# Patient Record
Sex: MALE | Race: WHITE | NOT HISPANIC OR LATINO | URBAN - METROPOLITAN AREA
[De-identification: names, ages, dates, MRNs, and addresses within clinical notes are randomized per-mention and may not be internally consistent; named-entity substitution may affect disease eponyms.]

---

## 2024-03-27 ENCOUNTER — INPATIENT (INPATIENT)
Facility: HOSPITAL | Age: 49
LOS: 7 days | Discharge: HOME | DRG: 369 | End: 2024-04-04
Attending: INTERNAL MEDICINE | Admitting: HOSPITALIST
Payer: SELF-PAY

## 2024-03-27 VITALS
HEART RATE: 80 BPM | WEIGHT: 175.05 LBS | DIASTOLIC BLOOD PRESSURE: 84 MMHG | RESPIRATION RATE: 20 BRPM | OXYGEN SATURATION: 99 % | SYSTOLIC BLOOD PRESSURE: 137 MMHG | TEMPERATURE: 98 F | HEIGHT: 70 IN

## 2024-03-27 DIAGNOSIS — I20.0 UNSTABLE ANGINA: ICD-10-CM

## 2024-03-27 DIAGNOSIS — Z90.81 ACQUIRED ABSENCE OF SPLEEN: Chronic | ICD-10-CM

## 2024-03-27 LAB
ADD ON TEST-SPECIMEN IN LAB: SIGNIFICANT CHANGE UP
ALBUMIN SERPL ELPH-MCNC: 4.2 G/DL — SIGNIFICANT CHANGE UP (ref 3.3–5)
ALP SERPL-CCNC: 95 U/L — SIGNIFICANT CHANGE UP (ref 40–120)
ALT FLD-CCNC: 18 U/L — SIGNIFICANT CHANGE UP (ref 10–45)
AMPHET UR-MCNC: NEGATIVE — SIGNIFICANT CHANGE UP
ANION GAP SERPL CALC-SCNC: 11 MMOL/L — SIGNIFICANT CHANGE UP (ref 5–17)
APAP SERPL-MCNC: <15 UG/ML — SIGNIFICANT CHANGE UP (ref 10–30)
APPEARANCE UR: CLEAR — SIGNIFICANT CHANGE UP
APTT BLD: 28.1 SEC — SIGNIFICANT CHANGE UP (ref 24.5–35.6)
AST SERPL-CCNC: 26 U/L — SIGNIFICANT CHANGE UP (ref 10–40)
BACTERIA # UR AUTO: NEGATIVE /HPF — SIGNIFICANT CHANGE UP
BARBITURATES UR SCN-MCNC: NEGATIVE — SIGNIFICANT CHANGE UP
BASE EXCESS BLDV CALC-SCNC: 3.6 MMOL/L — HIGH (ref -2–3)
BASOPHILS # BLD AUTO: 0.07 K/UL — SIGNIFICANT CHANGE UP (ref 0–0.2)
BASOPHILS NFR BLD AUTO: 1.5 % — SIGNIFICANT CHANGE UP (ref 0–2)
BENZODIAZ UR-MCNC: NEGATIVE — SIGNIFICANT CHANGE UP
BILIRUB SERPL-MCNC: 0.1 MG/DL — LOW (ref 0.2–1.2)
BILIRUB UR-MCNC: NEGATIVE — SIGNIFICANT CHANGE UP
BLD GP AB SCN SERPL QL: NEGATIVE — SIGNIFICANT CHANGE UP
BUN SERPL-MCNC: 20 MG/DL — SIGNIFICANT CHANGE UP (ref 7–23)
CA-I SERPL-SCNC: 1.24 MMOL/L — SIGNIFICANT CHANGE UP (ref 1.15–1.33)
CALCIUM SERPL-MCNC: 9.3 MG/DL — SIGNIFICANT CHANGE UP (ref 8.4–10.5)
CAST: 0 /LPF — SIGNIFICANT CHANGE UP (ref 0–4)
CHLORIDE BLDV-SCNC: 102 MMOL/L — SIGNIFICANT CHANGE UP (ref 96–108)
CHLORIDE SERPL-SCNC: 101 MMOL/L — SIGNIFICANT CHANGE UP (ref 96–108)
CK MB CFR SERPL CALC: 4.3 NG/ML — SIGNIFICANT CHANGE UP (ref 0–6.7)
CK SERPL-CCNC: 157 U/L — SIGNIFICANT CHANGE UP (ref 30–200)
CO2 BLDV-SCNC: 31 MMOL/L — HIGH (ref 22–26)
CO2 SERPL-SCNC: 26 MMOL/L — SIGNIFICANT CHANGE UP (ref 22–31)
COCAINE METAB.OTHER UR-MCNC: NEGATIVE — SIGNIFICANT CHANGE UP
COLOR SPEC: YELLOW — SIGNIFICANT CHANGE UP
CREAT SERPL-MCNC: 0.87 MG/DL — SIGNIFICANT CHANGE UP (ref 0.5–1.3)
DIFF PNL FLD: NEGATIVE — SIGNIFICANT CHANGE UP
EGFR: 106 ML/MIN/1.73M2 — SIGNIFICANT CHANGE UP
EOSINOPHIL # BLD AUTO: 0.56 K/UL — HIGH (ref 0–0.5)
EOSINOPHIL NFR BLD AUTO: 11.8 % — HIGH (ref 0–6)
ETHANOL SERPL-MCNC: <10 MG/DL — SIGNIFICANT CHANGE UP (ref 0–10)
GAS PNL BLDV: 134 MMOL/L — LOW (ref 136–145)
GAS PNL BLDV: SIGNIFICANT CHANGE UP
GLUCOSE BLDV-MCNC: 111 MG/DL — HIGH (ref 70–99)
GLUCOSE SERPL-MCNC: 118 MG/DL — HIGH (ref 70–99)
GLUCOSE UR QL: NEGATIVE MG/DL — SIGNIFICANT CHANGE UP
HCO3 BLDV-SCNC: 30 MMOL/L — HIGH (ref 22–29)
HCT VFR BLD CALC: 34.9 % — LOW (ref 39–50)
HCT VFR BLDA CALC: 36 % — LOW (ref 39–51)
HGB BLD CALC-MCNC: 11.9 G/DL — LOW (ref 12.6–17.4)
HGB BLD-MCNC: 11.5 G/DL — LOW (ref 13–17)
INR BLD: 1.03 RATIO — SIGNIFICANT CHANGE UP (ref 0.85–1.18)
KETONES UR-MCNC: NEGATIVE MG/DL — SIGNIFICANT CHANGE UP
LACTATE BLDV-MCNC: 1.4 MMOL/L — SIGNIFICANT CHANGE UP (ref 0.5–2)
LEUKOCYTE ESTERASE UR-ACNC: NEGATIVE — SIGNIFICANT CHANGE UP
LYMPHOCYTES # BLD AUTO: 1.84 K/UL — SIGNIFICANT CHANGE UP (ref 1–3.3)
LYMPHOCYTES # BLD AUTO: 38.8 % — SIGNIFICANT CHANGE UP (ref 13–44)
MAGNESIUM SERPL-MCNC: 1.9 MG/DL — SIGNIFICANT CHANGE UP (ref 1.6–2.6)
MCHC RBC-ENTMCNC: 27.8 PG — SIGNIFICANT CHANGE UP (ref 27–34)
MCHC RBC-ENTMCNC: 33 GM/DL — SIGNIFICANT CHANGE UP (ref 32–36)
MCV RBC AUTO: 84.3 FL — SIGNIFICANT CHANGE UP (ref 80–100)
METHADONE UR-MCNC: NEGATIVE — SIGNIFICANT CHANGE UP
MONOCYTES # BLD AUTO: 0.61 K/UL — SIGNIFICANT CHANGE UP (ref 0–0.9)
MONOCYTES NFR BLD AUTO: 12.9 % — SIGNIFICANT CHANGE UP (ref 2–14)
NEUTROPHILS # BLD AUTO: 1.66 K/UL — LOW (ref 1.8–7.4)
NEUTROPHILS NFR BLD AUTO: 35 % — LOW (ref 43–77)
NITRITE UR-MCNC: NEGATIVE — SIGNIFICANT CHANGE UP
NRBC # BLD: 0 /100 WBCS — SIGNIFICANT CHANGE UP (ref 0–0)
NT-PROBNP SERPL-SCNC: 124 PG/ML — SIGNIFICANT CHANGE UP (ref 0–300)
OPIATES UR-MCNC: NEGATIVE — SIGNIFICANT CHANGE UP
OXYCODONE UR-MCNC: NEGATIVE — SIGNIFICANT CHANGE UP
PCO2 BLDV: 50 MMHG — SIGNIFICANT CHANGE UP (ref 42–55)
PCP SPEC-MCNC: SIGNIFICANT CHANGE UP
PCP UR-MCNC: NEGATIVE — SIGNIFICANT CHANGE UP
PH BLDV: 7.38 — SIGNIFICANT CHANGE UP (ref 7.32–7.43)
PH UR: 6.5 — SIGNIFICANT CHANGE UP (ref 5–8)
PHOSPHATE SERPL-MCNC: 2.8 MG/DL — SIGNIFICANT CHANGE UP (ref 2.5–4.5)
PLATELET # BLD AUTO: 368 K/UL — SIGNIFICANT CHANGE UP (ref 150–400)
PO2 BLDV: 40 MMHG — SIGNIFICANT CHANGE UP (ref 25–45)
POTASSIUM BLDV-SCNC: 4 MMOL/L — SIGNIFICANT CHANGE UP (ref 3.5–5.1)
POTASSIUM SERPL-MCNC: 4.2 MMOL/L — SIGNIFICANT CHANGE UP (ref 3.5–5.3)
POTASSIUM SERPL-SCNC: 4.2 MMOL/L — SIGNIFICANT CHANGE UP (ref 3.5–5.3)
PROT SERPL-MCNC: 7.4 G/DL — SIGNIFICANT CHANGE UP (ref 6–8.3)
PROT UR-MCNC: NEGATIVE MG/DL — SIGNIFICANT CHANGE UP
PROTHROM AB SERPL-ACNC: 11.3 SEC — SIGNIFICANT CHANGE UP (ref 9.5–13)
RBC # BLD: 4.14 M/UL — LOW (ref 4.2–5.8)
RBC # FLD: 14 % — SIGNIFICANT CHANGE UP (ref 10.3–14.5)
RBC CASTS # UR COMP ASSIST: 0 /HPF — SIGNIFICANT CHANGE UP (ref 0–4)
RH IG SCN BLD-IMP: POSITIVE — SIGNIFICANT CHANGE UP
SALICYLATES SERPL-MCNC: <2 MG/DL — LOW (ref 15–30)
SAO2 % BLDV: 72.2 % — SIGNIFICANT CHANGE UP (ref 67–88)
SODIUM SERPL-SCNC: 138 MMOL/L — SIGNIFICANT CHANGE UP (ref 135–145)
SP GR SPEC: >1.03 — HIGH (ref 1–1.03)
SQUAMOUS # UR AUTO: 0 /HPF — SIGNIFICANT CHANGE UP (ref 0–5)
THC UR QL: NEGATIVE — SIGNIFICANT CHANGE UP
TROPONIN T, HIGH SENSITIVITY RESULT: 9 NG/L — SIGNIFICANT CHANGE UP (ref 0–51)
UROBILINOGEN FLD QL: 0.2 MG/DL — SIGNIFICANT CHANGE UP (ref 0.2–1)
WBC # BLD: 4.74 K/UL — SIGNIFICANT CHANGE UP (ref 3.8–10.5)
WBC # FLD AUTO: 4.74 K/UL — SIGNIFICANT CHANGE UP (ref 3.8–10.5)
WBC UR QL: 0 /HPF — SIGNIFICANT CHANGE UP (ref 0–5)

## 2024-03-27 PROCEDURE — 99223 1ST HOSP IP/OBS HIGH 75: CPT

## 2024-03-27 PROCEDURE — 99285 EMERGENCY DEPT VISIT HI MDM: CPT

## 2024-03-27 PROCEDURE — 71275 CT ANGIOGRAPHY CHEST: CPT | Mod: 26,MC

## 2024-03-27 PROCEDURE — 71045 X-RAY EXAM CHEST 1 VIEW: CPT | Mod: 26

## 2024-03-27 PROCEDURE — 74174 CTA ABD&PLVS W/CONTRAST: CPT | Mod: 26,MC

## 2024-03-27 RX ORDER — ASPIRIN/CALCIUM CARB/MAGNESIUM 324 MG
324 TABLET ORAL ONCE
Refills: 0 | Status: COMPLETED | OUTPATIENT
Start: 2024-03-27 | End: 2024-03-27

## 2024-03-27 RX ORDER — PANTOPRAZOLE SODIUM 20 MG/1
8 TABLET, DELAYED RELEASE ORAL
Qty: 80 | Refills: 0 | Status: DISCONTINUED | OUTPATIENT
Start: 2024-03-27 | End: 2024-03-29

## 2024-03-27 RX ORDER — FAMOTIDINE 10 MG/ML
20 INJECTION INTRAVENOUS ONCE
Refills: 0 | Status: COMPLETED | OUTPATIENT
Start: 2024-03-27 | End: 2024-03-27

## 2024-03-27 RX ORDER — PANTOPRAZOLE SODIUM 20 MG/1
80 TABLET, DELAYED RELEASE ORAL ONCE
Refills: 0 | Status: COMPLETED | OUTPATIENT
Start: 2024-03-27 | End: 2024-03-27

## 2024-03-27 RX ORDER — ONDANSETRON 8 MG/1
4 TABLET, FILM COATED ORAL ONCE
Refills: 0 | Status: COMPLETED | OUTPATIENT
Start: 2024-03-27 | End: 2024-03-27

## 2024-03-27 RX ORDER — SODIUM CHLORIDE 9 MG/ML
1000 INJECTION INTRAMUSCULAR; INTRAVENOUS; SUBCUTANEOUS ONCE
Refills: 0 | Status: COMPLETED | OUTPATIENT
Start: 2024-03-27 | End: 2024-03-27

## 2024-03-27 RX ORDER — ONDANSETRON 8 MG/1
4 TABLET, FILM COATED ORAL EVERY 8 HOURS
Refills: 0 | Status: DISCONTINUED | OUTPATIENT
Start: 2024-03-27 | End: 2024-04-04

## 2024-03-27 RX ORDER — ACETAMINOPHEN 500 MG
650 TABLET ORAL EVERY 6 HOURS
Refills: 0 | Status: DISCONTINUED | OUTPATIENT
Start: 2024-03-27 | End: 2024-04-04

## 2024-03-27 RX ORDER — NITROGLYCERIN 6.5 MG
0.4 CAPSULE, EXTENDED RELEASE ORAL ONCE
Refills: 0 | Status: COMPLETED | OUTPATIENT
Start: 2024-03-27 | End: 2024-03-27

## 2024-03-27 RX ORDER — ACETAMINOPHEN 500 MG
1000 TABLET ORAL ONCE
Refills: 0 | Status: COMPLETED | OUTPATIENT
Start: 2024-03-27 | End: 2024-03-27

## 2024-03-27 RX ADMIN — Medication 0.4 MILLIGRAM(S): at 19:25

## 2024-03-27 RX ADMIN — SODIUM CHLORIDE 1000 MILLILITER(S): 9 INJECTION INTRAMUSCULAR; INTRAVENOUS; SUBCUTANEOUS at 19:14

## 2024-03-27 RX ADMIN — ONDANSETRON 4 MILLIGRAM(S): 8 TABLET, FILM COATED ORAL at 18:33

## 2024-03-27 RX ADMIN — PANTOPRAZOLE SODIUM 10 MG/HR: 20 TABLET, DELAYED RELEASE ORAL at 22:10

## 2024-03-27 RX ADMIN — Medication 400 MILLIGRAM(S): at 18:33

## 2024-03-27 RX ADMIN — PANTOPRAZOLE SODIUM 80 MILLIGRAM(S): 20 TABLET, DELAYED RELEASE ORAL at 18:33

## 2024-03-27 RX ADMIN — Medication 324 MILLIGRAM(S): at 19:14

## 2024-03-27 RX ADMIN — FAMOTIDINE 20 MILLIGRAM(S): 10 INJECTION INTRAVENOUS at 19:46

## 2024-03-27 NOTE — CONSULT NOTE ADULT - ATTENDING COMMENTS
As per fellow note, atypical chest / neck discomfort in setting of a number of CRF's. EKG with LBBB (no baseline for comparison); trops negative. This am, chest / neck pain better, but remains (mild); worse with inspiration. Suggest echo and CCTA. If normal, can proceed with further GI evaluation.

## 2024-03-27 NOTE — H&P ADULT - NSHPREVIEWOFSYSTEMS_GEN_ALL_CORE
Review of Systems:   CONSTITUTIONAL: No fever, weight loss  EYES: No eye pain, visual disturbances, or discharge  ENMT:  No difficulty hearing, tinnitus, vertigo; No sinus or throat pain  RESPIRATORY: No SOB. No cough, wheezing, chills or hemoptysis  CARDIOVASCULAR: No chest pain, palpitations, dizziness, or leg swelling  GASTROINTESTINAL: No abdominal or epigastric pain. No nausea, vomiting, or hematemesis; No diarrhea or constipation. No melena or hematochezia.  GENITOURINARY: No dysuria, frequency, hematuria, or incontinence  NEUROLOGICAL: No headaches, memory loss, loss of strength, numbness, or tremors  SKIN: No itching, burning, rashes, or lesions   LYMPH NODES: No enlarged glands  ENDOCRINE: No heat or cold intolerance; No hair loss  MUSCULOSKELETAL: No joint pain or swelling; No muscle, back pain  PSYCHIATRIC: No depression, anxiety, mood swings, or difficulty sleeping  HEME/LYMPH: No easy bruising, or bleeding gums Review of Systems:   CONSTITUTIONAL: No fever, weight loss  EYES: No eye pain, visual disturbances, or discharge  ENMT:  No difficulty hearing, tinnitus, vertigo; No sinus or throat pain  RESPIRATORY: No SOB. No cough, wheezing, chills or hemoptysis  CARDIOVASCULAR: No chest pain, palpitations, dizziness, or leg swelling  GASTROINTESTINAL: +nausea and vomiting. No hematemesis; No diarrhea or constipation. No melena or hematochezia.  GENITOURINARY: No dysuria, frequency, hematuria, or incontinence  NEUROLOGICAL: No headaches, memory loss, loss of strength, numbness, or tremors  SKIN: No itching, burning, rashes, or lesions   LYMPH NODES: No enlarged glands  ENDOCRINE: No heat or cold intolerance; No hair loss  MUSCULOSKELETAL: No joint pain or swelling; No muscle, back pain  PSYCHIATRIC: No depression, anxiety, mood swings, or difficulty sleeping  HEME/LYMPH: No easy bruising, or bleeding gums Review of Systems:   CONSTITUTIONAL: No fever, weight loss  EYES: No eye pain, visual disturbances, or discharge  RESPIRATORY: No SOB. No cough, wheezing, chills or hemoptysis  CARDIOVASCULAR: +upper chest and neck pain, no palpitations, dizziness, or leg swelling  GASTROINTESTINAL: +nausea and vomiting. No hematemesis; No diarrhea or constipation. No melena or hematochezia.  GENITOURINARY: No dysuria, frequency, hematuria, or incontinence  NEUROLOGICAL: No headaches, memory loss, loss of strength, numbness, or tremors  SKIN: No itching, burning, rashes, or lesions   MUSCULOSKELETAL: No joint pain or swelling; No muscle, back pain  PSYCHIATRIC: No depression, anxiety, mood swings, or difficulty sleeping

## 2024-03-27 NOTE — H&P ADULT - NSHPLABSRESULTS_GEN_ALL_CORE
11.5   4.74  )-----------( 368      ( 27 Mar 2024 18:36 )             34.9     03-27    138  |  101  |  20  ----------------------------<  118<H>  4.2   |  26  |  0.87    Ca    9.3      27 Mar 2024 18:36  Phos  2.8     03-27  Mg     1.9     03-27    TPro  7.4  /  Alb  4.2  /  TBili  0.1<L>  /  DBili  x   /  AST  26  /  ALT  18  /  AlkPhos  95  03-27    CARDIAC MARKERS ( 27 Mar 2024 19:52 )  x     / x     / 157 U/L / x     / 4.3 ng/mL  CARDIAC MARKERS ( 27 Mar 2024 18:36 )  x     / x     / 178 U/L / x     / 4.7 ng/mL        LIVER FUNCTIONS - ( 27 Mar 2024 18:36 )  Alb: 4.2 g/dL / Pro: 7.4 g/dL / ALK PHOS: 95 U/L / ALT: 18 U/L / AST: 26 U/L / GGT: x           PT/INR - ( 27 Mar 2024 18:36 )   PT: 11.3 sec;   INR: 1.03 ratio         PTT - ( 27 Mar 2024 18:36 )  PTT:28.1 sec  Urinalysis Basic - ( 27 Mar 2024 21:33 )    Color: Yellow / Appearance: Clear / SG: >1.030 / pH: x  Gluc: x / Ketone: Negative mg/dL  / Bili: Negative / Urobili: 0.2 mg/dL   Blood: x / Protein: Negative mg/dL / Nitrite: Negative   Leuk Esterase: Negative / RBC: 0 /HPF / WBC 0 /HPF   Sq Epi: x / Non Sq Epi: 0 /HPF / Bacteria: Negative /HPF

## 2024-03-27 NOTE — H&P ADULT - HISTORY OF PRESENT ILLNESS
This is a 49 y/o male w/ PMHx sarcoidosis, optic neuritis and active smoking who presents for chest pain and black emesis. His symptoms first started with an episode of emesis yesterdat which was yellow-colored. Today, he started vomiting several times, and at the 5th episode, the color of the vomitus turned from yellow to a dark, black-arleth color. Around that time, he also started to develop chest pain as well. The chest pain was in the middle of his chest, radiated to the left side of his neck, squeezing in quality, and was constant. Nothing relieved or worsened the pain, and he didn't have any associated shortness of breath, dyspnea on exertion, diaphoresis, or palpitations. He doesn't take any medications, hasn't seen a doctor in years. Is currently an active smoker, is a 15-pack year smoker and smokes 1/2 pack a day currently. Decided to come to the ED for further evaluation.    In the ED, he was afebrile and hemodynamically stable, saturating well on RA. CBC w/ normocytic anemia of 11.5, CMP grossly wnl. Troponin 9-> 10, CKMB 4.7->4.3, CPK 2.6%, pro-. UA w/ specific gravity >1.03. Urine drug screen negative. CTA chest/abdomen/pelvis was done, which was negative for dissection, positive for coronary calcifications, and wall thickening of the GE junction and gastric cardia. ECG showing NSR w/ LBBB, no prior comparisons available. Given ASA 324mg, SL NTG, IVP pantoprazole 80mg, IVP Pepcid 20mg, and started on pantoprazole drip in the ED. This is a 47 y/o male w/ PMHx sarcoidosis, optic neuritis and active smoking who presents for chest pain and black emesis. His symptoms first started with an episode of emesis yesterdat which was yellow-colored. Today, he started vomiting several times, and at the 5th episode, the color of the vomitus turned from yellow to a dark, black-arleth color. Around that time, he also started to develop chest pain as well. The chest pain was in the middle of his chest, radiated to the left side of his neck, squeezing in quality, and was constant. Nothing relieved or worsened the pain, and he didn't have any associated shortness of breath, dyspnea on exertion, diaphoresis, or palpitations. He doesn't take any medications. Is currently an active smoker, is a 15-pack year smoker and smokes 1/2 pack a day currently. Decided to come to the ED for further evaluation.  Of note, he states he had a lower GI bleed over 2 decades ago and required a colonoscopy, hasn't had one since then. His sarcoidosis was diagnosed 3 years ago in [place] when an MRI of his brain which was being done for optic neuritis captured the top of his lungs. He had a mediastinoscopy w/ biopsy which confirmed the diagnosis. He doesn't take any medications for it, said he has taken prednisone in the past for a flare-up. Currently lives in [facility], says he follows with [facility].    In the ED, he was afebrile and hemodynamically stable, saturating well on RA. CBC w/ normocytic anemia of 11.5, CMP grossly wnl. Troponin 9-> 10, CKMB 4.7->4.3, CPK 2.6%, pro-. UA w/ specific gravity >1.03. Urine drug screen negative. CTA chest/abdomen/pelvis was done, which was negative for dissection, positive for coronary calcifications, and wall thickening of the GE junction and gastric cardia. ECG showing NSR w/ LBBB, no prior comparisons available. Given ASA 324mg, SL NTG, IVP pantoprazole 80mg, IVP Pepcid 20mg, and started on pantoprazole drip in the ED.

## 2024-03-27 NOTE — H&P ADULT - PROBLEM SELECTOR PLAN 2
- Troponin negative x2, check 3rd troponin per cardiology recs  - ECG NSR w/ LBBB  - Unclear if angina vs pain from frequent vomiting, per cardio, hold off on further antiplatelets at this time, patient s/p ASA 324mg in ED  - Coronary calcifications seen on CTA chest, cardiology considering CT coronaries, f/u official decision  - SL NTG not helpful for chest pain  - Heart score of 5 - (2pts for highly suspicious chest pain, 1 pt ECG w/ LBBB, 1 pt age >45, 1 pt for 1-2 risk factors [active smoking, MI in mother])  - Urine drug screen negative  - F/u A1c, TSH, lipid panel  - F/u TTE - Troponin negative x2, check 3rd troponin per cardiology recs  - ECG NSR w/ LBBB  - Unclear if angina vs pain from frequent vomiting, per cardio, hold off on further antiplatelets at this time given patient with coffee-ground emesis, patient s/p ASA 324mg in ED  - Coronary calcifications seen on CTA chest, cardiology considering CT coronaries, f/u official decision  - SL NTG not helpful for chest pain  - Heart score of 5 - (2pts for highly suspicious chest pain, 1 pt ECG w/ LBBB, 1 pt age >45, 1 pt for 1-2 risk factors [active smoking, MI in mother])  - Urine drug screen negative  - F/u A1c, TSH, lipid panel  - F/u TTE

## 2024-03-27 NOTE — H&P ADULT - PROBLEM SELECTOR PLAN 3
- Hb 11.5, no recent baseline to compare  - F/u iron, TIBC, ferritin, transferrin, ferritin, and B12  - - Hb 11.5, no recent baseline to compare  - F/u iron, TIBC, ferritin, transferrin, ferritin, and B12

## 2024-03-27 NOTE — CONSULT NOTE ADULT - SUBJECTIVE AND OBJECTIVE BOX
Chief Complaint: Chest pain     HPI:  47 yo man with poor medical contact who presents to the ED with chest pain.         PMHx:   Sarcoidosis    PSHx:       Allergies:  No Known Allergies      Home Meds:    Current Medications:       REVIEW OF SYSTEMS:  CONSTITUTIONAL: No weakness, fevers or chills  EYES/ENT: No visual changes;  No dysphagia  NECK: No pain or stiffness  RESPIRATORY: No cough, wheezing, hemoptysis; No shortness of breath  CARDIOVASCULAR: No chest pain or palpitations; No lower extremity edema  GASTROINTESTINAL: No abdominal or epigastric pain. No nausea, vomiting, or hematemesis; No diarrhea or constipation. No melena or hematochezia.  BACK: No back pain  GENITOURINARY: No dysuria, frequency or hematuria  NEUROLOGICAL: No numbness or weakness  SKIN: No itching, burning, rashes, or lesions   All other review of systems is negative unless indicated above.    Physical Exam:  T(F): 98 (03-27), Max: 98 (03-27)  HR: 80 (03-27) (80 - 80)  BP: 121/69 (03-27) (121/69 - 137/84)  RR: 19 (03-27)  SpO2: 98% (03-27)  GENERAL: No acute distress, well-developed  HEAD:  Atraumatic, Normocephalic  ENT: EOMI, PERRLA, conjunctiva and sclera clear, Neck supple, No JVD, moist mucosa  CHEST/LUNG: Clear to auscultation bilaterally; No wheeze, equal breath sounds bilaterally   BACK: No spinal tenderness  HEART: Regular rate and rhythm; No murmurs, rubs, or gallops  ABDOMEN: Soft, Nontender, Nondistended; Bowel sounds present  EXTREMITIES:  No clubbing, cyanosis, or edema  PSYCH: Nl behavior, nl affect  NEUROLOGY: AAOx3, non-focal, cranial nerves intact  SKIN: Normal color, No rashes or lesions  LINES:    Cardiovascular Diagnostic Testing:    ECG: Personally reviewed:    Echo: Personally reviewed:    Stress Testing:    Cath:    Imaging:    CXR: Personally reviewed    Labs: Personally reviewed                        11.5   4.74  )-----------( 368      ( 27 Mar 2024 18:36 )             34.9     03-27    138  |  101  |  20  ----------------------------<  118<H>  4.2   |  26  |  0.87    Ca    9.3      27 Mar 2024 18:36  Phos  2.8     03-27  Mg     1.9     03-27    TPro  7.4  /  Alb  4.2  /  TBili  0.1<L>  /  DBili  x   /  AST  26  /  ALT  18  /  AlkPhos  95  03-27    PT/INR - ( 27 Mar 2024 18:36 )   PT: 11.3 sec;   INR: 1.03 ratio         PTT - ( 27 Mar 2024 18:36 )  PTT:28.1 sec    CARDIAC MARKERS ( 27 Mar 2024 18:36 )  9 ng/L / x     / x     / 178 U/L / x     / 4.7 ng/mL                 Chief Complaint: Chest pain     HPI:  49 yo man with hx of pulmonary sarcoidosis, active tobacco use, optic neuritis, and poor medical contact who presents to the ED with chest pain.   Yesterday has had one episode of yellow colored emesis. Today had persistent nausea with 8+ episodes of emesis. Initially emesis was yellow, but after ~5 he reports it being very dark, black in color. After 5 he also developed chest pain that was L sided. Started 4 hours prior to my encounter with him (probably started around 3pm). Radiated up L side of neck. Squeezing and sore like pain. Constant. No prior episodes. Not worsened by exertion, position, and eating. Denies SOB, orthopnea, palpitations, fevers, chills, diarrhea, cough, and URI sx. Denies alcohol and recreational drug use. 15 pack year smoking history. Smokes 1/2ppd currently. Has never seen a Cardiologist. No prior MI or stents. Mother had MI in her late 70s. Does not take any medications.     In the ED   VSS.   EKG SR with LBBB not meeting Sgarbossa criteria.   Still complaining of chest pain refractory to SL nitro.   Cardiology consulted for unstable angina.         PMHx:   Sarcoidosis    PSHx:   Splenectomy    Allergies:  No Known Allergies    Home Meds:  None    Current Medications:   None     REVIEW OF SYSTEMS:  CONSTITUTIONAL: No weakness, fevers or chills  RESPIRATORY: No cough, wheezing, hemoptysis; No shortness of breath  CARDIOVASCULAR: No palpitations; No lower extremity edema  GASTROINTESTINAL: No abdominal or epigastric pain. No nausea, vomiting, or hematemesis; No diarrhea or constipation. No melena or hematochezia.  BACK: No back pain  GENITOURINARY: No dysuria, frequency or hematuria  NEUROLOGICAL: No numbness or weakness  SKIN: No itching, burning, rashes, or lesions   All other review of systems is negative unless indicated above.    Physical Exam:  T(F): 98 (03-27), Max: 98 (03-27)  HR: 80 (03-27) (80 - 80)  BP: 121/69 (03-27) (121/69 - 137/84)  RR: 19 (03-27)  SpO2: 98% (03-27)    GENERAL: thin, unkempt, poor dentition, appears older than stated age   HEAD:  Atraumatic, Normocephalic  ENT: EOMI, PERRLA, conjunctiva and sclera clear, Neck supple, No JVD, moist mucosa  CHEST/LUNG: Clear to auscultation bilaterally; No wheeze, equal breath sounds bilaterally   BACK: No spinal tenderness  HEART: Regular rate and rhythm; No murmurs, rubs, or gallops  ABDOMEN: Soft, Nontender, Nondistended; Bowel sounds present  EXTREMITIES:  No clubbing, cyanosis, or edema  PSYCH: Nl behavior, nl affect  NEUROLOGY: AAOx3, non-focal, cranial nerves intact  SKIN: Normal color, No rashes or lesions  LINES:      Imaging:    CXR: Personally reviewed    Labs: Personally reviewed                        11.5   4.74  )-----------( 368      ( 27 Mar 2024 18:36 )             34.9     03-27    138  |  101  |  20  ----------------------------<  118<H>  4.2   |  26  |  0.87    Ca    9.3      27 Mar 2024 18:36  Phos  2.8     03-27  Mg     1.9     03-27    TPro  7.4  /  Alb  4.2  /  TBili  0.1<L>  /  DBili  x   /  AST  26  /  ALT  18  /  AlkPhos  95  03-27    PT/INR - ( 27 Mar 2024 18:36 )   PT: 11.3 sec;   INR: 1.03 ratio         PTT - ( 27 Mar 2024 18:36 )  PTT:28.1 sec    CARDIAC MARKERS ( 27 Mar 2024 18:36 )  9 ng/L / x     / x     / 178 U/L / x     / 4.7 ng/mL

## 2024-03-27 NOTE — H&P ADULT - ASSESSMENT
49 y/o male w/ PMHx sarcoidosis, optic neuritis and active smoking who presents for chest pain and black emesis. Found to have anemia of 11.5 and LBBB on ECG. Admitted for workup of possible UGIB and of chest pain.

## 2024-03-27 NOTE — H&P ADULT - NSHPPHYSICALEXAM_GEN_ALL_CORE
Vital Signs Last 24 Hrs  T(C): 36.7 (27 Mar 2024 21:22), Max: 36.7 (27 Mar 2024 18:14)  T(F): 98.1 (27 Mar 2024 21:22), Max: 98.1 (27 Mar 2024 21:22)  HR: 61 (27 Mar 2024 23:06) (61 - 84)  BP: 132/65 (27 Mar 2024 23:06) (121/69 - 137/84)  BP(mean): 85 (27 Mar 2024 23:06) (85 - 89)  RR: 18 (27 Mar 2024 23:06) (18 - 20)  SpO2: 97% (27 Mar 2024 23:06) (97% - 99%)    Parameters below as of 27 Mar 2024 23:06  Patient On (Oxygen Delivery Method): room air        CONSTITUTIONAL: Well-groomed, in no apparent distress  EYES: No conjunctival or scleral injection, non-icteric;   ENMT: No external nasal lesions; MMM  NECK: Trachea midline without palpable neck mass; thyroid not enlarged and non-tender  RESPIRATORY: Breathing comfortably; no dullness to percussion; lungs CTA without wheeze/rhonchi/rales  CARDIOVASCULAR: +S1S2, RRR, no M/G/R; pedal pulses full and symmetric; no lower extremity edema  GASTROINTESTINAL: No palpable masses or tenderness, +BS throughout, no rebound/guarding; no hepatosplenomegaly; no hernia palpated  LYMPHATIC: No cervical LAD or tenderness  SKIN: No rashes or ulcers noted  NEUROLOGIC: CN II-XII intact; sensation intact in LEs b/l to light touch  PSYCHIATRIC: A+O x 3; mood and affect appropriate; appropriate insight and judgment Vital Signs Last 24 Hrs  T(C): 36.7 (27 Mar 2024 21:22), Max: 36.7 (27 Mar 2024 18:14)  T(F): 98.1 (27 Mar 2024 21:22), Max: 98.1 (27 Mar 2024 21:22)  HR: 61 (27 Mar 2024 23:06) (61 - 84)  BP: 132/65 (27 Mar 2024 23:06) (121/69 - 137/84)  BP(mean): 85 (27 Mar 2024 23:06) (85 - 89)  RR: 18 (27 Mar 2024 23:06) (18 - 20)  SpO2: 97% (27 Mar 2024 23:06) (97% - 99%)    Parameters below as of 27 Mar 2024 23:06  Patient On (Oxygen Delivery Method): room air    CONSTITUTIONAL: Well-groomed, in no apparent distress  EYES: No conjunctival or scleral injection, non-icteric;   NECK: Trachea midline without palpable neck mass; thyroid not enlarged and non-tender  RESPIRATORY: Breathing comfortably; no dullness to percussion; lungs CTA without wheeze/rhonchi/rales  CARDIOVASCULAR: +S1S2, RRR, no M/G/R; pedal pulses full and symmetric; no lower extremity edema  GASTROINTESTINAL: No palpable masses or tenderness, +BS throughout, no rebound/guarding  NEUROLOGIC: Sensation intact in LEs b/l to light touch, no focal deficits, 5/5 strength across all extremities  PSYCHIATRIC: AAO x 4

## 2024-03-27 NOTE — ED PROVIDER NOTE - OBJECTIVE STATEMENT
48-year-old male with past medical history of sarcoidosis, not currently on any medications, daily smoker presenting with chief complaint of nausea, vomiting since yesterday.  Today developed black emesis.  Associating with pressure-like chest pain.  Has a history of black emesis in the past,  patient unsure what the underlying diagnosis was at that time.

## 2024-03-27 NOTE — H&P ADULT - PROBLEM SELECTOR PLAN 5
-  CTA chest showing lobulated 1.4 x 0.8 cm density along superior aspect of the right major fissure which appears to be on both sides of the fissure, 5 mm RLL nodule, and linear peripheral density of RLL w/ small nodular component measuring 8 mm  - No prior exams available for comparison, will need outpatient f/u w/ pulmonary for surveillance CTs

## 2024-03-27 NOTE — ED PROVIDER NOTE - PROGRESS NOTE DETAILS
Hemoglobin 11.5.  Do not suspect that patient's chest pain is from anemia.  Will order aspirin   And plan for repeat EKG. Dinorah Hawk, ED Attending Repeat EKG unchanged.  Patient given aspirin and sublingual nitro, still endorsing pain.  Now also endorsing right flank pain.  Will order UA UC, cardiology consulted. Will consider CT angio of the chest abdomen pelvis if no actionable plans with cardiology.  repeat troponin ordered. Dinorah Hawk, ED Attending Amy Whalen (Rodriguez) PGY3 maryann has seen and evaluated pt. agree with admission for cardiac workup at this time. pain still significant. will eval for intra-abdominal and non-coronary etiology of back pain with dissection study. tba. Amy Whalen (Rodriguez) PGY3 accepted to hospitalist service. CT aorta with coronary calcifications and gastritis. GI was emailed. hospitalist requesting protonix gtt.

## 2024-03-27 NOTE — ED PROVIDER NOTE - CLINICAL SUMMARY MEDICAL DECISION MAKING FREE TEXT BOX
48-year-old male with past medical history as detailed above presenting with chief complaint of nausea and vomiting for a day and a half along with pressure-like chest pain that started today.  Endorses shortness of breath.  No pleurisy.  No other significant associated symptoms.  On exam, vital signs stable, lung exam without any focal findings on auscultation.  No reproducible chest pain.  Mild reproducible right upper quadrant pain, but Anthony negative.  Broad differentials at this time.  Given black emesis, concern for possible upper GI bleed  causing anemia versus gallbladder pathology  Versus Boerhaave's or esophageal injury from vomiting versus pneumomediastinum versus ACS, patient has a  left bundle branch block on EKG, no prior EKG available.  Does not meet Sgarbossa's criteria.  will assess H&H and chest x-ray.  If those unremarkable, consider treating as unstable angina. Dinorah Hawk, ED Attending

## 2024-03-27 NOTE — ED ADULT NURSE NOTE - OBJECTIVE STATEMENT
49 y/o Male presents to ED from home with c/o chest pain. Pt states pain began today around 3pm and is located in left chest wall and left neck. Pt was doing marimar when pain began. Endorsing nausea and vomited about 9-10 times. Vomiting began last night. Endorses SOB and feels diaphoretic, pt states he is very anxious.  Denies HA, fever, chills, cough, D, abd pain, urinary s/s. Pt is A&Ox3, appears anxious, speaking full sentences without difficulty. Airway patent with spontaneous unlabored breathing, skin is warm and normal color for race.  Pt placed on continuous cardiac monitor. IV inserted labs drawn and sent. Safety maintained bed is in the lowest position, locked and call bell in reach.

## 2024-03-27 NOTE — ED PROVIDER NOTE - PHYSICAL EXAMINATION
Const: Well-nourished, Well-developed, appearing stated age.  Eyes: no conjunctival injection, and symmetrical lids.  HEENT: Head NCAT, no lesions. Atraumatic external nose and ears.   Neck: Symmetric, trachea midline.   CVS: +S1/S2, Radial and DP pulses 2+ b/l.   RESP: +mildly labored respiratory effort. Clear to auscultation bilaterally.  GI: Mild RUQ tttp, Anthony negative, Nondistended, No CVA tenderness b/l.   MSK: Normocephalic/Atraumatic, Lower Extremities w/o edema b/l.   Skin: Warm, dry and intact.   Neuro: Fluent Speech. Moving all extremities.   Psych: Awake, Alert, & Oriented (AAO) x3. Appropriate mood and affect.

## 2024-03-27 NOTE — ED ADULT TRIAGE NOTE - MEANS OF ARRIVAL
Endoscope was pre-cleaned at bedside immediately following procedure by TERESA LI. Anesthesia staff at patient's bedside administering anesthesia and monitoring patients vital signs throughout procedure. See anesthesia note.
ambulatory

## 2024-03-27 NOTE — ED PROVIDER NOTE - CARE PLAN
Principal Discharge DX:	Unstable angina   1 Principal Discharge DX:	Unstable angina  Secondary Diagnosis:	Hematemesis

## 2024-03-27 NOTE — H&P ADULT - PROBLEM SELECTOR PLAN 1
- CTA abdomen/pelvis showing distal esophageal wall thickening and associated wall thickening of the GE junction and gastric cardia which may be related to focal underdistention, postsurgical changes or sequelae of focal gastritis  - S/p pantoprazole 80mg IVP  - C/w IV Pantoprazole drip  - GI contacted by ED, f/u recs  - NPO for now, patient will likely need EGD

## 2024-03-27 NOTE — H&P ADULT - PROBLEM SELECTOR PLAN 4
- Not on any meds - Not on any meds  - Was diagnosed in 2021 in [place] by a pulmonologist  - Has taken prednisone in the past for a flare

## 2024-03-27 NOTE — ED ADULT NURSE REASSESSMENT NOTE - NS ED NURSE REASSESS COMMENT FT1
pharmacy contacted again regarding protonix infusion not yet received via tube station. pending medication from pharmacy.

## 2024-03-28 DIAGNOSIS — K92.0 HEMATEMESIS: ICD-10-CM

## 2024-03-28 DIAGNOSIS — K92.2 GASTROINTESTINAL HEMORRHAGE, UNSPECIFIED: ICD-10-CM

## 2024-03-28 DIAGNOSIS — Z29.9 ENCOUNTER FOR PROPHYLACTIC MEASURES, UNSPECIFIED: ICD-10-CM

## 2024-03-28 DIAGNOSIS — R07.9 CHEST PAIN, UNSPECIFIED: ICD-10-CM

## 2024-03-28 DIAGNOSIS — D64.9 ANEMIA, UNSPECIFIED: ICD-10-CM

## 2024-03-28 DIAGNOSIS — D86.9 SARCOIDOSIS, UNSPECIFIED: ICD-10-CM

## 2024-03-28 DIAGNOSIS — R91.8 OTHER NONSPECIFIC ABNORMAL FINDING OF LUNG FIELD: ICD-10-CM

## 2024-03-28 LAB
ALBUMIN SERPL ELPH-MCNC: 3.8 G/DL — SIGNIFICANT CHANGE UP (ref 3.3–5)
ALP SERPL-CCNC: 82 U/L — SIGNIFICANT CHANGE UP (ref 40–120)
ALT FLD-CCNC: 14 U/L — SIGNIFICANT CHANGE UP (ref 10–45)
ANION GAP SERPL CALC-SCNC: 11 MMOL/L — SIGNIFICANT CHANGE UP (ref 5–17)
APTT BLD: 28 SEC — SIGNIFICANT CHANGE UP (ref 24.5–35.6)
AST SERPL-CCNC: 21 U/L — SIGNIFICANT CHANGE UP (ref 10–40)
BILIRUB SERPL-MCNC: 0.2 MG/DL — SIGNIFICANT CHANGE UP (ref 0.2–1.2)
BUN SERPL-MCNC: 18 MG/DL — SIGNIFICANT CHANGE UP (ref 7–23)
CALCIUM SERPL-MCNC: 9.1 MG/DL — SIGNIFICANT CHANGE UP (ref 8.4–10.5)
CHLORIDE SERPL-SCNC: 108 MMOL/L — SIGNIFICANT CHANGE UP (ref 96–108)
CHOLEST SERPL-MCNC: 128 MG/DL — SIGNIFICANT CHANGE UP
CO2 SERPL-SCNC: 23 MMOL/L — SIGNIFICANT CHANGE UP (ref 22–31)
CREAT SERPL-MCNC: 0.79 MG/DL — SIGNIFICANT CHANGE UP (ref 0.5–1.3)
CULTURE RESULTS: SIGNIFICANT CHANGE UP
EGFR: 110 ML/MIN/1.73M2 — SIGNIFICANT CHANGE UP
FERRITIN SERPL-MCNC: 13 NG/ML — LOW (ref 30–400)
FOLATE SERPL-MCNC: 15.5 NG/ML — SIGNIFICANT CHANGE UP
GLUCOSE SERPL-MCNC: 68 MG/DL — LOW (ref 70–99)
HCT VFR BLD CALC: 32.6 % — LOW (ref 39–50)
HDLC SERPL-MCNC: 40 MG/DL — LOW
HGB BLD-MCNC: 10.4 G/DL — LOW (ref 13–17)
INR BLD: 1.08 RATIO — SIGNIFICANT CHANGE UP (ref 0.85–1.18)
IRON SATN MFR SERPL: 23 UG/DL — LOW (ref 45–165)
IRON SATN MFR SERPL: 8 % — LOW (ref 16–55)
LIPID PNL WITH DIRECT LDL SERPL: 76 MG/DL — SIGNIFICANT CHANGE UP
MCHC RBC-ENTMCNC: 27.6 PG — SIGNIFICANT CHANGE UP (ref 27–34)
MCHC RBC-ENTMCNC: 31.9 GM/DL — LOW (ref 32–36)
MCV RBC AUTO: 86.5 FL — SIGNIFICANT CHANGE UP (ref 80–100)
NON HDL CHOLESTEROL: 88 MG/DL — SIGNIFICANT CHANGE UP
NRBC # BLD: 0 /100 WBCS — SIGNIFICANT CHANGE UP (ref 0–0)
PLATELET # BLD AUTO: 335 K/UL — SIGNIFICANT CHANGE UP (ref 150–400)
POTASSIUM SERPL-MCNC: 4.2 MMOL/L — SIGNIFICANT CHANGE UP (ref 3.5–5.3)
POTASSIUM SERPL-SCNC: 4.2 MMOL/L — SIGNIFICANT CHANGE UP (ref 3.5–5.3)
PROT SERPL-MCNC: 6.7 G/DL — SIGNIFICANT CHANGE UP (ref 6–8.3)
PROTHROM AB SERPL-ACNC: 11.3 SEC — SIGNIFICANT CHANGE UP (ref 9.5–13)
RBC # BLD: 3.77 M/UL — LOW (ref 4.2–5.8)
RBC # FLD: 14.2 % — SIGNIFICANT CHANGE UP (ref 10.3–14.5)
SODIUM SERPL-SCNC: 142 MMOL/L — SIGNIFICANT CHANGE UP (ref 135–145)
SPECIMEN SOURCE: SIGNIFICANT CHANGE UP
TIBC SERPL-MCNC: 292 UG/DL — SIGNIFICANT CHANGE UP (ref 220–430)
TRIGL SERPL-MCNC: 51 MG/DL — SIGNIFICANT CHANGE UP
TROPONIN T, HIGH SENSITIVITY RESULT: 8 NG/L — SIGNIFICANT CHANGE UP (ref 0–51)
TROPONIN T, HIGH SENSITIVITY RESULT: 9 NG/L — SIGNIFICANT CHANGE UP (ref 0–51)
TSH SERPL-MCNC: 0.37 UIU/ML — SIGNIFICANT CHANGE UP (ref 0.27–4.2)
UIBC SERPL-MCNC: 269 UG/DL — SIGNIFICANT CHANGE UP (ref 110–370)
VIT B12 SERPL-MCNC: 435 PG/ML — SIGNIFICANT CHANGE UP (ref 232–1245)
WBC # BLD: 4.05 K/UL — SIGNIFICANT CHANGE UP (ref 3.8–10.5)
WBC # FLD AUTO: 4.05 K/UL — SIGNIFICANT CHANGE UP (ref 3.8–10.5)

## 2024-03-28 PROCEDURE — 99232 SBSQ HOSP IP/OBS MODERATE 35: CPT

## 2024-03-28 PROCEDURE — 75574 CT ANGIO HRT W/3D IMAGE: CPT | Mod: 26

## 2024-03-28 PROCEDURE — 93306 TTE W/DOPPLER COMPLETE: CPT | Mod: 26

## 2024-03-28 PROCEDURE — 99222 1ST HOSP IP/OBS MODERATE 55: CPT | Mod: GC

## 2024-03-28 PROCEDURE — 99254 IP/OBS CNSLTJ NEW/EST MOD 60: CPT

## 2024-03-28 RX ORDER — LIDOCAINE 4 G/100G
1 CREAM TOPICAL DAILY
Refills: 0 | Status: DISCONTINUED | OUTPATIENT
Start: 2024-03-28 | End: 2024-04-04

## 2024-03-28 RX ADMIN — Medication 650 MILLIGRAM(S): at 15:20

## 2024-03-28 RX ADMIN — PANTOPRAZOLE SODIUM 10 MG/HR: 20 TABLET, DELAYED RELEASE ORAL at 22:29

## 2024-03-28 NOTE — CONSULT NOTE ADULT - ASSESSMENT
This is a 48 year old male with sarcoidosis, optic neuritis and nicotine use disorder who is admitted with coffee ground emesis and chest pain.     #Coffee ground emesis  #Acute blood loss anemia  #Chest pain  #Weight loss  Patient with multiple episodes of yellow vomiting followed by multiple episodes of black emesis. Hgb down to 10.4 g/dL. Remains vitally stable. C/o chest pain with negative ECG/troponins and CTA, however CT notable for thickening at the GE junction. Suspect UGIB due to esophagitis or josie-zheng tear. DDx includes PUD, angiectasia and malignancy. No use of NSAIDS or A/C. PLT, coags and CMP are normal. No alcohol use or evidence of liver disease.     Recommendations:  - Closely monitor vital signs and for clinical signs of bleeding  - Track all stool output and color  - Maintain two large bore peripheral IVs  - Trend CBC, maintain active T&S and transfuse to goal hgb > 7 g/dL  - IV PPI BID  - Anti-emetics as needed  - IVF hydration  - NPO for EGD today if at acceptable risk from a cardiac standpoint    Andrés Reyes MD  Gastroenterology/Hepatology Fellow  Available via [employer] Teams  Pager: [phone]    NON-URGENT CONSULTS:  Please email [email or web address]  AT NIGHT AND ON WEEKENDS:  Contact on-call GI fellow via answering service ([phone]) from 5pm-8am and on weekends/holidays  MONDAY-FRIDAY 8AM-5PM:  Pager# [phone] ([facility]) or [phone] ([facility])
47 yo man with hx of pulmonary sarcoidosis, active tobacco use, optic neuritis, and poor medical contact who presents to the ED with chest pain.     EKG: SR with LBBB. Not meeting Sgarbossa criteria.   Trop 9    CKMB 4.7  LA negative.     Overall this is a young man who appears older than stated age presenting with chest pain that is possibly cardiac in nature. His chest pain is squeezing and radiating up to his L neck, but is not improved with nitro and does not worsen with exertion. At this time, his cardiac biomarkers are wnl and his EKG does not meet Sgarbossa criteria. However, he does have significant cardiac RFs which include smoking and inflammatory disease (sarcoid). His HEART Score is at least 4 and therefore he should be worked up for ischemic heart disease. It is possible that he is having active unstable angina, but given that his chest pain has been ongoing for four hours I would expect that his HST to be slightly elevated. Given that his initial complaints were GI in nature and that he had  dark colored emesis I would be hesitant to start antiplatelets at this time.     Recommendations:  -hold off on ACS treatment   -trend troponin, CK, CKMB x3  -check TTE   -check hemoglobin A1c, TSH, and lipid profile   -urine/serum tox screen   -work up of dark emesis   -will consider CT coronaries given young age   -SL nitro prn for chest pain     Please see attending attestation for final recommendations.     Bran Rosa MD  Cardiology Fellow

## 2024-03-28 NOTE — PROGRESS NOTE ADULT - ASSESSMENT
47 y/o male w/ PMHx sarcoidosis, optic neuritis and active smoking who presents for chest pain and black emesis. Found to have anemia of 11.5 and LBBB on ECG. Admitted for workup of possible UGIB and of chest pain.

## 2024-03-28 NOTE — CONSULT NOTE ADULT - SUBJECTIVE AND OBJECTIVE BOX
Chief Complaint:  coffee-ground emesis    HPI:    This is a 48 year old male with sarcoidosis, optic neuritis and nicotine use disorder who presents for chest pain and black emesis. His symptoms first started with an episode of emesis Tuesday which was yellow-colored. On Wednesday, he vomiting several times, and at the 5th episode the color of the vomitus turned from yellow to a dark, black-arleth color. He vomiting approximately 8-10 times in total yesterday with multiple episodes of black emesis. Prior to symptoms he was in his usual state of health. He then developed left-sided chest pain, radiated to the left side of his neck, squeezing in quality, and constant. Nothing relieved or worsened the pain, and he didn't have any associated shortness of breath, dyspnea on exertion, diaphoresis, or palpitations. He doesn't take any medications including NSAIDS or blood thinners. Is currently an active smoker, is a 15-pack year smoker and smokes 1/2 pack a day currently. No EtOH use. Reports a 30lb weight loss over several months. He has occasional heartburn that is managed with PRN Rolaids. Denies abdominal pain, melena or hematochezia. No prior episodes of similar symptoms.  No family history of GI malignancy. He had an EGD around 2005 for dysphagia and a colonoscopy around 10 years ago for hematochezia without significant findings per patient.     In the ED, he was afebrile and hemodynamically stable, saturating well on RA. CBC w/ normocytic anemia of 11.5, CMP grossly wnl. Troponin 9-> 10, CKMB 4.7->4.3, pro-. Urine drug screen negative. CTA chest/abdomen/pelvis was done, which was negative for dissection or free air, positive for coronary calcifications, and wall thickening of the GE junction and gastric cardia. Given ASA 324mg, SL NTG, IVP pantoprazole 80mg, IVP Pepcid 20mg, and started on pantoprazole drip in the ED. Evaluated by Cardiology with low concern for ACS.     Allergies:  No Known Allergies      Hospital Medications:  acetaminophen     Tablet .. 650 milliGRAM(s) Oral every 6 hours PRN  ondansetron Injectable 4 milliGRAM(s) IV Push every 8 hours PRN  pantoprazole Infusion 8 mG/Hr IV Continuous <Continuous>      PMHX/PSHX:  Sarcoidosis    Sarcoidosis    History of optic neuritis    History of splenectomy    Family history:  FH: CAD (coronary artery disease) (Mother)    Social History: smoking    ROS:   See HPI    PHYSICAL EXAM:   GENERAL:  NAD, resting comfortably in bed  HEENT:  Sclera anicteric  CHEST:  Normal effort  HEART:  HDS  ABDOMEN:  Soft, non-tender, non-distended  EXTREMITIES:  No edema  SKIN:  Warm & Dry.   NEURO:  Alert, conversant, no focal deficit    Vital Signs:  Vital Signs Last 24 Hrs  T(C): 36.8 (28 Mar 2024 08:00), Max: 36.8 (28 Mar 2024 08:00)  T(F): 98.2 (28 Mar 2024 08:00), Max: 98.2 (28 Mar 2024 08:00)  HR: 60 (28 Mar 2024 08:00) (58 - 84)  BP: 125/80 (28 Mar 2024 08:00) (121/69 - 137/84)  BP(mean): 85 (27 Mar 2024 23:06) (85 - 89)  RR: 18 (28 Mar 2024 08:00) (18 - 20)  SpO2: 99% (28 Mar 2024 08:00) (97% - 99%)    Parameters below as of 28 Mar 2024 08:00  Patient On (Oxygen Delivery Method): room air      Daily Height in cm: 177.8 (27 Mar 2024 18:14)    Daily     LABS:                        10.4   4.05  )-----------( 335      ( 28 Mar 2024 08:24 )             32.6     Mean Cell Volume: 86.5 fl (03-28-24 @ 08:24)    03-28    142  |  108  |  18  ----------------------------<  68<L>  4.2   |  23  |  0.79    Ca    9.1      28 Mar 2024 08:24  Phos  2.8     03-27  Mg     1.9     03-27    TPro  6.7  /  Alb  3.8  /  TBili  0.2  /  DBili  x   /  AST  21  /  ALT  14  /  AlkPhos  82  03-28    LIVER FUNCTIONS - ( 28 Mar 2024 08:24 )  Alb: 3.8 g/dL / Pro: 6.7 g/dL / ALK PHOS: 82 U/L / ALT: 14 U/L / AST: 21 U/L / GGT: x           PT/INR - ( 28 Mar 2024 08:24 )   PT: 11.3 sec;   INR: 1.08 ratio         PTT - ( 28 Mar 2024 08:24 )  PTT:28.0 sec  Urinalysis Basic - ( 28 Mar 2024 08:24 )    Color: x / Appearance: x / SG: x / pH: x  Gluc: 68 mg/dL / Ketone: x  / Bili: x / Urobili: x   Blood: x / Protein: x / Nitrite: x   Leuk Esterase: x / RBC: x / WBC x   Sq Epi: x / Non Sq Epi: x / Bacteria: x                              10.4   4.05  )-----------( 335      ( 28 Mar 2024 08:24 )             32.6                         11.5   4.74  )-----------( 368      ( 27 Mar 2024 18:36 )             34.9     Imaging:    < from: CT Angio Abdomen and Pelvis w/ IV Cont (03.27.24 @ 21:03) >  FINDINGS:    AORTA: There is no aortic aneurysm or dissection. Minimal atherosclerotic   calcifications of the distal abdominal aorta are appreciated. The   thoracic and mesenteric branch vessels are patent, without stenosis.    CHEST:  LUNGS AND LARGE AIRWAYS: Patent central airways. No confluent airspace   opacities. Lobulated 1.4 x 0.8 cm density is seen along thesuperior   aspect of the right major fissure which appears to be on both sides of   the fissure. This is either several adjacent nodules or a single lobular   nodule. Additional 5 mm right lower lobe pulmonary nodule (304:82).   Calcified granuloma of the left upper lobe. There is linear scarring   versus atelectatic changes of the anterior right middle lobe and lingula.   Linear peripheral density of the right lower lobe is a small nodular   component measuring 8 mm.  PLEURA: No pleural effusion.  VESSELS: Coronary calcifications..  HEART: Heart size is normal. No pericardial effusion.  MEDIASTINUM AND ARTEM: No lymphadenopathy.  CHEST WALL AND LOWER NECK: Within normal limits.    ABDOMEN AND PELVIS:  LIVER: Within normal limits.  BILE DUCTS:Normal caliber.  GALLBLADDER: Within normal limits.  SPLEEN: Splenectomy.  PANCREAS: Within normal limits.  ADRENALS: Within normal limits.  KIDNEYS/URETERS: Within normal limits.    BLADDER: Minimally distended though appears mildly thick-walled.  REPRODUCTIVE ORGANS: Prostate within normal limits.    BOWEL: No bowel obstruction. Appendix is normal. Moderate stool burden   within the proximal colon may be related to constipation. There is wall   thickening of the distal esophagus with either mild wall edema or trace   paraesophageal fluid on the right. There is also questionable thickening   of the GE junction and the gastric cardia. Surgical clips are seen along   the posterior cardia/fundus. It is unclear if this is related to   splenectomy or prior gastric surgery.  PERITONEUM: No ascites.  VESSELS: Minimal atherosclerotic changes of the distal abdominal aorta..  RETROPERITONEUM/LYMPH NODES: No pathologically enlarged lymph nodes.    Nonenlarged nonspecific inguinal lymph nodes appreciated, more numerous   on the right.  ABDOMINAL WALL: Slight stranding seen in the right inguinal region   possibly related to previous vascular access.  BONES: Minimal degenerative changes.    IMPRESSION:  No aortic aneurysm or dissection.    Coronary calcifications.    Distal esophageal wall thickening question of mural edema versus trace   paraesophageal fluid. Correlate clinically for acute esophagitis. Other   mucosal abnormality not excluded.    There is also associated wall thickening of the GE junction and gastric   cardia which may be related to focal underdistention, postsurgical   changes or sequelae of focal gastritis. Endoscopic evaluation may be   warranted.      < end of copied text >

## 2024-03-28 NOTE — PROGRESS NOTE ADULT - PROBLEM SELECTOR PLAN 1
- CTA abdomen/pelvis showing distal esophageal wall thickening and associated wall thickening of the GE junction and gastric cardia which may be related to focal underdistention, postsurgical changes or sequelae of focal gastritis  - S/p pantoprazole 80mg IVP  - C/w IV Pantoprazole drip  - GI contacted by ED, f/u recs  - patient will likely need EGD but ate a cracker - EGD tomorrow

## 2024-03-28 NOTE — PROGRESS NOTE ADULT - SUBJECTIVE AND OBJECTIVE BOX
[facility] Division of Hospital Medicine  Jaguar Bender MD  Contact M-F, 8A-5P through [employer] Teams  Other times, contact Hospitalist on call    Patient is a 48y old  Male who presents with a chief complaint of Chest pain, coffee-ground emesis (28 Mar 2024 09:06)    SUBJECTIVE / OVERNIGHT EVENTS: having flank pain primarily   ADDITIONAL REVIEW OF SYSTEMS:    MEDICATIONS  (STANDING):  pantoprazole Infusion 8 mG/Hr (10 mL/Hr) IV Continuous <Continuous>    MEDICATIONS  (PRN):  acetaminophen     Tablet .. 650 milliGRAM(s) Oral every 6 hours PRN Temp greater or equal to 38C (100.4F), Mild Pain (1 - 3)  ondansetron Injectable 4 milliGRAM(s) IV Push every 8 hours PRN Nausea and/or Vomiting      CAPILLARY BLOOD GLUCOSE        I&O's Summary      PHYSICAL EXAM:  Vital Signs Last 24 Hrs  T(C): 36.7 (28 Mar 2024 13:07), Max: 36.8 (28 Mar 2024 08:00)  T(F): 98.1 (28 Mar 2024 13:07), Max: 98.2 (28 Mar 2024 08:00)  HR: 61 (28 Mar 2024 13:07) (58 - 84)  BP: 116/66 (28 Mar 2024 13:07) (116/66 - 137/84)  BP(mean): 85 (27 Mar 2024 23:06) (85 - 89)  RR: 16 (28 Mar 2024 13:07) (16 - 20)  SpO2: 99% (28 Mar 2024 13:07) (97% - 99%)    Parameters below as of 28 Mar 2024 13:07  Patient On (Oxygen Delivery Method): room air      CONSTITUTIONAL: Well-groomed, mild distress due to flank pain  EYES: No conjunctival or scleral injection, non-icteric;   NECK: Trachea midline without palpable neck mass; thyroid not enlarged and non-tender  RESPIRATORY: Breathing comfortably; no dullness to percussion; lungs CTA without wheeze/rhonchi/rales  CARDIOVASCULAR: +S1S2, RRR, no M/G/R; pedal pulses full and symmetric; no lower extremity edema  GASTROINTESTINAL: No palpable masses or tenderness, +BS throughout, no rebound/guarding +tender L sided flank and back pain  NEUROLOGIC: Sensation intact in LEs b/l to light touch, no focal deficits, 5/5 strength across all extremities  PSYCHIATRIC: AAO x 4  LABS:                        10.4   4.05  )-----------( 335      ( 28 Mar 2024 08:24 )             32.6     03-28    142  |  108  |  18  ----------------------------<  68<L>  4.2   |  23  |  0.79    Ca    9.1      28 Mar 2024 08:24  Phos  2.8     03-27  Mg     1.9     03-27    TPro  6.7  /  Alb  3.8  /  TBili  0.2  /  DBili  x   /  AST  21  /  ALT  14  /  AlkPhos  82  03-28    PT/INR - ( 28 Mar 2024 08:24 )   PT: 11.3 sec;   INR: 1.08 ratio         PTT - ( 28 Mar 2024 08:24 )  PTT:28.0 sec  CARDIAC MARKERS ( 27 Mar 2024 19:52 )  x     / x     / 157 U/L / x     / 4.3 ng/mL  CARDIAC MARKERS ( 27 Mar 2024 18:36 )  x     / x     / 178 U/L / x     / 4.7 ng/mL      Urinalysis Basic - ( 28 Mar 2024 08:24 )    Color: x / Appearance: x / SG: x / pH: x  Gluc: 68 mg/dL / Ketone: x  / Bili: x / Urobili: x   Blood: x / Protein: x / Nitrite: x   Leuk Esterase: x / RBC: x / WBC x   Sq Epi: x / Non Sq Epi: x / Bacteria: x      RADIOLOGY & ADDITIONAL TESTS:  Results Reviewed:   Imaging Personally Reviewed:  Electrocardiogram Personally Reviewed:    COORDINATION OF CARE:  Care Discussed with Consultants/Other Providers [Y/N]:  Prior or Outpatient Records Reviewed [Y/N]:

## 2024-03-29 LAB
A1C WITH ESTIMATED AVERAGE GLUCOSE RESULT: 5.1 % — SIGNIFICANT CHANGE UP (ref 4–5.6)
ANION GAP SERPL CALC-SCNC: 9 MMOL/L — SIGNIFICANT CHANGE UP (ref 5–17)
BUN SERPL-MCNC: 17 MG/DL — SIGNIFICANT CHANGE UP (ref 7–23)
CALCIUM SERPL-MCNC: 9.1 MG/DL — SIGNIFICANT CHANGE UP (ref 8.4–10.5)
CHLORIDE SERPL-SCNC: 105 MMOL/L — SIGNIFICANT CHANGE UP (ref 96–108)
CO2 SERPL-SCNC: 23 MMOL/L — SIGNIFICANT CHANGE UP (ref 22–31)
CREAT SERPL-MCNC: 0.84 MG/DL — SIGNIFICANT CHANGE UP (ref 0.5–1.3)
EGFR: 108 ML/MIN/1.73M2 — SIGNIFICANT CHANGE UP
ESTIMATED AVERAGE GLUCOSE: 100 MG/DL — SIGNIFICANT CHANGE UP (ref 68–114)
GLUCOSE SERPL-MCNC: 97 MG/DL — SIGNIFICANT CHANGE UP (ref 70–99)
HCT VFR BLD CALC: 33.9 % — LOW (ref 39–50)
HGB BLD-MCNC: 10.8 G/DL — LOW (ref 13–17)
MCHC RBC-ENTMCNC: 27 PG — SIGNIFICANT CHANGE UP (ref 27–34)
MCHC RBC-ENTMCNC: 31.9 GM/DL — LOW (ref 32–36)
MCV RBC AUTO: 84.8 FL — SIGNIFICANT CHANGE UP (ref 80–100)
NRBC # BLD: 0 /100 WBCS — SIGNIFICANT CHANGE UP (ref 0–0)
PLATELET # BLD AUTO: 366 K/UL — SIGNIFICANT CHANGE UP (ref 150–400)
POTASSIUM SERPL-MCNC: 4.2 MMOL/L — SIGNIFICANT CHANGE UP (ref 3.5–5.3)
POTASSIUM SERPL-SCNC: 4.2 MMOL/L — SIGNIFICANT CHANGE UP (ref 3.5–5.3)
RBC # BLD: 4 M/UL — LOW (ref 4.2–5.8)
RBC # FLD: 14.1 % — SIGNIFICANT CHANGE UP (ref 10.3–14.5)
SODIUM SERPL-SCNC: 137 MMOL/L — SIGNIFICANT CHANGE UP (ref 135–145)
TRANSFERRIN SERPL-MCNC: 243 MG/DL — SIGNIFICANT CHANGE UP (ref 200–360)
WBC # BLD: 4.81 K/UL — SIGNIFICANT CHANGE UP (ref 3.8–10.5)
WBC # FLD AUTO: 4.81 K/UL — SIGNIFICANT CHANGE UP (ref 3.8–10.5)

## 2024-03-29 PROCEDURE — 99233 SBSQ HOSP IP/OBS HIGH 50: CPT

## 2024-03-29 PROCEDURE — 43235 EGD DIAGNOSTIC BRUSH WASH: CPT | Mod: GC

## 2024-03-29 PROCEDURE — 99232 SBSQ HOSP IP/OBS MODERATE 35: CPT

## 2024-03-29 RX ORDER — PANTOPRAZOLE SODIUM 20 MG/1
40 TABLET, DELAYED RELEASE ORAL
Refills: 0 | Status: DISCONTINUED | OUTPATIENT
Start: 2024-03-29 | End: 2024-04-04

## 2024-03-29 RX ORDER — CHLORHEXIDINE GLUCONATE 213 G/1000ML
1 SOLUTION TOPICAL DAILY
Refills: 0 | Status: DISCONTINUED | OUTPATIENT
Start: 2024-03-29 | End: 2024-04-04

## 2024-03-29 RX ORDER — OXYCODONE HYDROCHLORIDE 5 MG/1
5 TABLET ORAL EVERY 6 HOURS
Refills: 0 | Status: DISCONTINUED | OUTPATIENT
Start: 2024-03-29 | End: 2024-04-04

## 2024-03-29 RX ORDER — ACETAMINOPHEN 500 MG
1000 TABLET ORAL ONCE
Refills: 0 | Status: COMPLETED | OUTPATIENT
Start: 2024-03-29 | End: 2024-03-29

## 2024-03-29 RX ADMIN — CHLORHEXIDINE GLUCONATE 1 APPLICATION(S): 213 SOLUTION TOPICAL at 11:52

## 2024-03-29 RX ADMIN — OXYCODONE HYDROCHLORIDE 5 MILLIGRAM(S): 5 TABLET ORAL at 19:53

## 2024-03-29 RX ADMIN — PANTOPRAZOLE SODIUM 10 MG/HR: 20 TABLET, DELAYED RELEASE ORAL at 06:14

## 2024-03-29 RX ADMIN — OXYCODONE HYDROCHLORIDE 5 MILLIGRAM(S): 5 TABLET ORAL at 11:50

## 2024-03-29 RX ADMIN — OXYCODONE HYDROCHLORIDE 5 MILLIGRAM(S): 5 TABLET ORAL at 12:50

## 2024-03-29 RX ADMIN — OXYCODONE HYDROCHLORIDE 5 MILLIGRAM(S): 5 TABLET ORAL at 20:30

## 2024-03-29 RX ADMIN — PANTOPRAZOLE SODIUM 10 MG/HR: 20 TABLET, DELAYED RELEASE ORAL at 14:06

## 2024-03-29 RX ADMIN — Medication 400 MILLIGRAM(S): at 19:53

## 2024-03-29 RX ADMIN — Medication 1000 MILLIGRAM(S): at 20:30

## 2024-03-29 NOTE — PROGRESS NOTE ADULT - SUBJECTIVE AND OBJECTIVE BOX
Patient is a 48y old  Male who presents with a chief complaint of Chest pain, coffee-ground emesis (28 Mar 2024 13:48)      SUBJECTIVE / OVERNIGHT EVENTS:  Pt seen and examined. No acute events overnight. He denies hematemesis, melena. Reports left sided chest pain, denies SOB. CP is worse with deep inspiration.    MEDICATIONS  (STANDING):  chlorhexidine 2% Cloths 1 Application(s) Topical daily  pantoprazole Infusion 8 mG/Hr (10 mL/Hr) IV Continuous <Continuous>    MEDICATIONS  (PRN):  acetaminophen     Tablet .. 650 milliGRAM(s) Oral every 6 hours PRN Temp greater or equal to 38C (100.4F), Mild Pain (1 - 3)  lidocaine   4% Patch 1 Patch Transdermal daily PRN flank pain  ondansetron Injectable 4 milliGRAM(s) IV Push every 8 hours PRN Nausea and/or Vomiting  oxyCODONE    IR 5 milliGRAM(s) Oral every 6 hours PRN Moderate Pain (4 - 6)      Vital Signs Last 24 Hrs  T(C): 36.8 (29 Mar 2024 11:07), Max: 36.8 (28 Mar 2024 21:27)  T(F): 98.3 (29 Mar 2024 11:07), Max: 98.3 (29 Mar 2024 11:07)  HR: 67 (29 Mar 2024 11:07) (55 - 67)  BP: 132/80 (29 Mar 2024 11:07) (121/68 - 144/76)  BP(mean): --  RR: 18 (29 Mar 2024 11:07) (17 - 18)  SpO2: 99% (29 Mar 2024 11:07) (98% - 99%)    Parameters below as of 29 Mar 2024 11:07  Patient On (Oxygen Delivery Method): room air      CAPILLARY BLOOD GLUCOSE        I&O's Summary    28 Mar 2024 07:01  -  29 Mar 2024 07:00  --------------------------------------------------------  IN: 0 mL / OUT: 1300 mL / NET: -1300 mL    29 Mar 2024 07:01  -  29 Mar 2024 13:36  --------------------------------------------------------  IN: 0 mL / OUT: 300 mL / NET: -300 mL        PHYSICAL EXAM:  GENERAL: NAD, well-groomed  HEAD:  Atraumatic, Normocephalic  EYES: EOMI, PERRLA, conjunctiva and sclera clear  NECK: Supple, No JVD  CHEST/LUNG: Clear to auscultation bilaterally; +reproducible left chest wall tenderness  HEART: Regular rate and rhythm; No murmurs, rubs, or gallops  ABDOMEN: Soft, Nontender, Nondistended; Bowel sounds present  EXTREMITIES:  2+ Peripheral Pulses, No clubbing, cyanosis, or edema  PSYCH: AAOx3  NEUROLOGY: non-focal  SKIN: No rashes or lesions    LABS:                        10.8   4.81  )-----------( 366      ( 29 Mar 2024 05:06 )             33.9     03-29    137  |  105  |  17  ----------------------------<  97  4.2   |  23  |  0.84    Ca    9.1      29 Mar 2024 05:06  Phos  2.8     03-27  Mg     1.9     03-27    TPro  6.7  /  Alb  3.8  /  TBili  0.2  /  DBili  x   /  AST  21  /  ALT  14  /  AlkPhos  82  03-28    PT/INR - ( 28 Mar 2024 08:24 )   PT: 11.3 sec;   INR: 1.08 ratio         PTT - ( 28 Mar 2024 08:24 )  PTT:28.0 sec  CARDIAC MARKERS ( 27 Mar 2024 19:52 )  x     / x     / 157 U/L / x     / 4.3 ng/mL  CARDIAC MARKERS ( 27 Mar 2024 18:36 )  x     / x     / 178 U/L / x     / 4.7 ng/mL      Urinalysis Basic - ( 29 Mar 2024 05:06 )    Color: x / Appearance: x / SG: x / pH: x  Gluc: 97 mg/dL / Ketone: x  / Bili: x / Urobili: x   Blood: x / Protein: x / Nitrite: x   Leuk Esterase: x / RBC: x / WBC x   Sq Epi: x / Non Sq Epi: x / Bacteria: x        RADIOLOGY & ADDITIONAL TESTS:    Imaging Personally Reviewed:  TTE 3/28   1. Left ventricular systolic function is moderately decreased with an ejection fraction visually estimated at 40 %.   2. Entire apex, entire inferior septum, entire inferior wall, and mid and apical anterior septum are abnormal.    FINDINGS:     Left Ventricle:   The left ventricular cavity is moderately dilated. Abnormal (paradoxical) septal motion consistent with left bundle branch block. Left ventricular systolic function is moderately decreased with an ejection fraction visually estimated at 40%. There is normal left ventricular diastolic function. No left ventricular hypertrophy.  LV Wall Scoring: The entire apex, entire inferior septum, entire inferior wall,  and mid and apical anterior septum are hypokinetic. All remaining scored  segments are normal.      CT ANGIO HEART W/CORONARIES 3/28  CT coronary angiography shows: (Overall limited evaluation of the   coronary arteries due to poor contrast opacification)  LMCA: Patent.  LAD: Eccentric mixed calcified and noncalcified plaques within the   proximal segment result in minimal (less than 30%) luminal narrowing.   Eccentric mixed calcified and noncalcified plaque within the mid segment   result in mild-to-moderate (about 50%) luminal narrowing.  LCx: Patent. Eccentric mixed calcified and noncalcified plaques in the   proximal segment of a prominent obtuse marginal artery result in minimal   (less than 30%) luminal narrowing.  RCA: Dominant vessel, with plaques within the proximal segment resulting   in minimal (less than 30%) luminal narrowing.      Consultant(s) Notes Reviewed:  GI, Cardiology

## 2024-03-29 NOTE — PATIENT PROFILE ADULT - DO YOU EVER NEED HELP READING HOSPITAL MATERIALS?
What is this referral relating to.  He tell you what the reason why he needs a referral in what the diagnosis was   no

## 2024-03-29 NOTE — PROGRESS NOTE ADULT - SUBJECTIVE AND OBJECTIVE BOX
Patient seen and examined at bedside.    Overnight Events:   FELIXMARJORIE  Continues to endorse chest discomfort       REVIEW OF SYSTEMS:  All other review of systems is negative unless indicated above.            Current Meds:  acetaminophen     Tablet .. 650 milliGRAM(s) Oral every 6 hours PRN  chlorhexidine 2% Cloths 1 Application(s) Topical daily  lidocaine   4% Patch 1 Patch Transdermal daily PRN  ondansetron Injectable 4 milliGRAM(s) IV Push every 8 hours PRN  oxyCODONE    IR 5 milliGRAM(s) Oral every 6 hours PRN  pantoprazole Infusion 8 mG/Hr IV Continuous <Continuous>      Vitals:  T(F): 97.8 (03-29), Max: 98.3 (03-29)  HR: 73 (03-29) (55 - 73)  BP: 134/89 (03-29) (121/68 - 144/76)  RR: 18 (03-29)  SpO2: 99% (03-29)  I&O's Summary    28 Mar 2024 07:01  -  29 Mar 2024 07:00  --------------------------------------------------------  IN: 0 mL / OUT: 1300 mL / NET: -1300 mL    29 Mar 2024 07:01  -  29 Mar 2024 15:53  --------------------------------------------------------  IN: 0 mL / OUT: 700 mL / NET: -700 mL        Physical Exam:  Appearance: No acute distress; well appearing  Eyes:  EOMI  HEENT: Normal oral mucosa  Cardiovascular: RRR, S1, S2, no murmurs, rubs, or gallops; no edema; no JVD  Respiratory: Clear to auscultation bilaterally  Gastrointestinal: soft, non-tender, non-distended  Musculoskeletal: No clubbing;  Neurologic: Non-focal  Psychiatry: AAOx3, mood & affect appropriate                          10.8   4.81  )-----------( 366      ( 29 Mar 2024 05:06 )             33.9     03-29    137  |  105  |  17  ----------------------------<  97  4.2   |  23  |  0.84    Ca    9.1      29 Mar 2024 05:06  Phos  2.8     03-27  Mg     1.9     03-27    TPro  6.7  /  Alb  3.8  /  TBili  0.2  /  DBili  x   /  AST  21  /  ALT  14  /  AlkPhos  82  03-28    PT/INR - ( 28 Mar 2024 08:24 )   PT: 11.3 sec;   INR: 1.08 ratio         PTT - ( 28 Mar 2024 08:24 )  PTT:28.0 sec  CARDIAC MARKERS ( 27 Mar 2024 19:52 )  x     / x     / 157 U/L / x     / 4.3 ng/mL  CARDIAC MARKERS ( 27 Mar 2024 18:36 )  x     / x     / 178 U/L / x     / 4.7 ng/mL              New ECG(s): Personally reviewed    Echo:  CONCLUSIONS:      1. Left ventricular systolic function is moderately decreased with an ejection fraction visually estimated at 40 %.   2. Entire apex, entire inferior septum, entire inferior wall, and mid and apical anterior septum are abnormal.        IMPRESSION:    CT coronary angiography shows: (Overall limited evaluation of the   coronary arteries due to poor contrast opacification)  LMCA: Patent.  LAD: Eccentric mixed calcified and noncalcified plaques within the   proximal segment result in minimal (less than 30%) luminal narrowing.   Eccentric mixed calcified and noncalcified plaque within the mid segment   result in mild-to-moderate (about 50%) luminal narrowing.  LCx: Patent. Eccentric mixed calcified and noncalcified plaques in the   proximal segment of a prominent obtuse marginal artery result in minimal   (less than 30%) luminal narrowing.  RCA: Dominant vessel, with plaques within the proximal segment resulting   in minimal (less than 30%) luminal narrowing.

## 2024-03-29 NOTE — PROGRESS NOTE ADULT - ASSESSMENT
47 yo man with hx of pulmonary sarcoidosis, active tobacco use, optic neuritis, and poor medical contact who presents to the ED with chest pain in s/o emesis. His trop is negative, EKG w/ LBB unclear if new. Symtpoms are not c/w ACS. Coronary CTA with non-obstructive disease, not likely to be the cause of his cardiomyopathy. Must  49 yo man with hx of pulmonary sarcoidosis, active tobacco use, optic neuritis, and poor medical contact who presents to the ED with chest pain in s/o emesis. His trop is negative, EKG w/ LBB unclear if new. Symtpoms are not c/w ACS. Coronary CTA with non-obstructive disease, not likely to be the cause of his cardiomyopathy. Must r/o cardaic sarcoid.     Recommendations  - Cardiac MRI  - High intensity statin  - ASA 81 mg daily  - GDMT: Start losartan 25 mg, aldactone 25  - SGLT2i when no further procedures planned  - Beta blocker limited by HR    Please see attending attestation for final recommendations        Josiah Ferguson MD  Cardiology Fellow     All Cardiology service information can be found 24/7 on [email or web address], password: [employer]

## 2024-03-29 NOTE — PRE PROCEDURE NOTE - PRE PROCEDURE EVALUATION
Attending Physician:                            Procedure: EGD    Indication for Procedure: coffee ground emesis  ________________________________________________________  PAST MEDICAL & SURGICAL HISTORY:  Sarcoidosis      History of optic neuritis      History of splenectomy        ALLERGIES:  No Known Allergies    HOME MEDICATIONS:    AICD/PPM: [ ] yes   [X ] no    PERTINENT LAB DATA:                        10.8   4.81  )-----------( 366      ( 29 Mar 2024 05:06 )             33.9     03-29    137  |  105  |  17  ----------------------------<  97  4.2   |  23  |  0.84    Ca    9.1      29 Mar 2024 05:06  Phos  2.8     03-27  Mg     1.9     03-27    TPro  6.7  /  Alb  3.8  /  TBili  0.2  /  DBili  x   /  AST  21  /  ALT  14  /  AlkPhos  82  03-28    PT/INR - ( 28 Mar 2024 08:24 )   PT: 11.3 sec;   INR: 1.08 ratio         PTT - ( 28 Mar 2024 08:24 )  PTT:28.0 sec  CARDIAC MARKERS ( 27 Mar 2024 19:52 )  x     / x     / 157 U/L / x     / 4.3 ng/mL  CARDIAC MARKERS ( 27 Mar 2024 18:36 )  x     / x     / 178 U/L / x     / 4.7 ng/mL            PHYSICAL EXAMINATION:    Height (cm): 177.8  Weight (kg): 79.4  BMI (kg/m2): 25.1  BSA (m2): 1.97T(C): 36.7  HR: 82  BP: 158/80  RR: 19  SpO2: 99%    Constitutional: NAD    Respiratory: normal effort  Cardiovascular: HDS  Gastrointestinal: soft, NT/ND  Extremities: No peripheral edema  Neurological: A/O x 3, no focal deficits        COMMENTS:    The patient is a suitable candidate for the planned procedure unless box checked [ ]  No, explain:

## 2024-03-29 NOTE — PATIENT PROFILE ADULT - FALL HARM RISK - FALLEN IN PAST
[FreeTextEntry1] : Rx refilled Atenolol, Simvastatin and Amlodipine\par Blood work obtained.\par Will call with results of blood work.\par 
No

## 2024-03-29 NOTE — PROGRESS NOTE ADULT - PROBLEM SELECTOR PLAN 1
- CTA abdomen/pelvis showing distal esophageal wall thickening and associated wall thickening of the GE junction and gastric cardia which may be related to focal underdistention, postsurgical changes or sequelae of focal gastritis  - C/w IV Pantoprazole drip  - monitor CBC daily ; transfuse for Hb < 7  - GI following ; for EGD today

## 2024-03-30 LAB
ALBUMIN SERPL ELPH-MCNC: 4.2 G/DL — SIGNIFICANT CHANGE UP (ref 3.3–5)
ALP SERPL-CCNC: 91 U/L — SIGNIFICANT CHANGE UP (ref 40–120)
ALT FLD-CCNC: 13 U/L — SIGNIFICANT CHANGE UP (ref 10–45)
ANION GAP SERPL CALC-SCNC: 15 MMOL/L — SIGNIFICANT CHANGE UP (ref 5–17)
AST SERPL-CCNC: 16 U/L — SIGNIFICANT CHANGE UP (ref 10–40)
BILIRUB SERPL-MCNC: 0.2 MG/DL — SIGNIFICANT CHANGE UP (ref 0.2–1.2)
BUN SERPL-MCNC: 19 MG/DL — SIGNIFICANT CHANGE UP (ref 7–23)
CALCIUM SERPL-MCNC: 9.6 MG/DL — SIGNIFICANT CHANGE UP (ref 8.4–10.5)
CHLORIDE SERPL-SCNC: 104 MMOL/L — SIGNIFICANT CHANGE UP (ref 96–108)
CO2 SERPL-SCNC: 20 MMOL/L — LOW (ref 22–31)
CREAT SERPL-MCNC: 0.9 MG/DL — SIGNIFICANT CHANGE UP (ref 0.5–1.3)
EGFR: 105 ML/MIN/1.73M2 — SIGNIFICANT CHANGE UP
GLUCOSE SERPL-MCNC: 117 MG/DL — HIGH (ref 70–99)
HCT VFR BLD CALC: 38.2 % — LOW (ref 39–50)
HGB BLD-MCNC: 12.1 G/DL — LOW (ref 13–17)
MAGNESIUM SERPL-MCNC: 1.8 MG/DL — SIGNIFICANT CHANGE UP (ref 1.6–2.6)
MCHC RBC-ENTMCNC: 26.9 PG — LOW (ref 27–34)
MCHC RBC-ENTMCNC: 31.7 GM/DL — LOW (ref 32–36)
MCV RBC AUTO: 85.1 FL — SIGNIFICANT CHANGE UP (ref 80–100)
NRBC # BLD: 0 /100 WBCS — SIGNIFICANT CHANGE UP (ref 0–0)
PLATELET # BLD AUTO: 384 K/UL — SIGNIFICANT CHANGE UP (ref 150–400)
POTASSIUM SERPL-MCNC: 3.8 MMOL/L — SIGNIFICANT CHANGE UP (ref 3.5–5.3)
POTASSIUM SERPL-SCNC: 3.8 MMOL/L — SIGNIFICANT CHANGE UP (ref 3.5–5.3)
PROT SERPL-MCNC: 7.6 G/DL — SIGNIFICANT CHANGE UP (ref 6–8.3)
RBC # BLD: 4.49 M/UL — SIGNIFICANT CHANGE UP (ref 4.2–5.8)
RBC # FLD: 14.1 % — SIGNIFICANT CHANGE UP (ref 10.3–14.5)
SODIUM SERPL-SCNC: 139 MMOL/L — SIGNIFICANT CHANGE UP (ref 135–145)
WBC # BLD: 6.03 K/UL — SIGNIFICANT CHANGE UP (ref 3.8–10.5)
WBC # FLD AUTO: 6.03 K/UL — SIGNIFICANT CHANGE UP (ref 3.8–10.5)

## 2024-03-30 PROCEDURE — 99232 SBSQ HOSP IP/OBS MODERATE 35: CPT

## 2024-03-30 RX ORDER — MORPHINE SULFATE 50 MG/1
2 CAPSULE, EXTENDED RELEASE ORAL ONCE
Refills: 0 | Status: DISCONTINUED | OUTPATIENT
Start: 2024-03-30 | End: 2024-03-30

## 2024-03-30 RX ORDER — LIDOCAINE 4 G/100G
1 CREAM TOPICAL ONCE
Refills: 0 | Status: COMPLETED | OUTPATIENT
Start: 2024-03-30 | End: 2024-03-30

## 2024-03-30 RX ADMIN — PANTOPRAZOLE SODIUM 40 MILLIGRAM(S): 20 TABLET, DELAYED RELEASE ORAL at 05:08

## 2024-03-30 RX ADMIN — OXYCODONE HYDROCHLORIDE 5 MILLIGRAM(S): 5 TABLET ORAL at 13:43

## 2024-03-30 RX ADMIN — OXYCODONE HYDROCHLORIDE 5 MILLIGRAM(S): 5 TABLET ORAL at 06:45

## 2024-03-30 RX ADMIN — LIDOCAINE 1 PATCH: 4 CREAM TOPICAL at 15:49

## 2024-03-30 RX ADMIN — PANTOPRAZOLE SODIUM 40 MILLIGRAM(S): 20 TABLET, DELAYED RELEASE ORAL at 17:14

## 2024-03-30 RX ADMIN — CHLORHEXIDINE GLUCONATE 1 APPLICATION(S): 213 SOLUTION TOPICAL at 17:14

## 2024-03-30 RX ADMIN — MORPHINE SULFATE 2 MILLIGRAM(S): 50 CAPSULE, EXTENDED RELEASE ORAL at 00:23

## 2024-03-30 RX ADMIN — MORPHINE SULFATE 2 MILLIGRAM(S): 50 CAPSULE, EXTENDED RELEASE ORAL at 00:50

## 2024-03-30 RX ADMIN — OXYCODONE HYDROCHLORIDE 5 MILLIGRAM(S): 5 TABLET ORAL at 14:23

## 2024-03-30 RX ADMIN — MORPHINE SULFATE 2 MILLIGRAM(S): 50 CAPSULE, EXTENDED RELEASE ORAL at 21:07

## 2024-03-30 RX ADMIN — OXYCODONE HYDROCHLORIDE 5 MILLIGRAM(S): 5 TABLET ORAL at 05:49

## 2024-03-30 NOTE — PROGRESS NOTE ADULT - PROBLEM SELECTOR PLAN 1
- CTA abdomen/pelvis showing distal esophageal wall thickening and associated wall thickening of the GE junction and gastric cardia which may be related to focal underdistention, postsurgical changes or sequelae of focal gastritis  - C/w IV Pantoprazole drip  - monitor CBC daily ; transfuse for Hb < 7  - s/p EGD with gastritis. OP f/u with GI - CTA abdomen/pelvis showing distal esophageal wall thickening and associated wall thickening of the GE junction and gastric cardia which may be related to focal underdistention, postsurgical changes or sequelae of focal gastritis  - C/w PO Pantoprazole  - monitor CBC daily ; transfuse for Hb < 7  - s/p EGD with gastritis. OP f/u with GI

## 2024-03-30 NOTE — PROGRESS NOTE ADULT - SUBJECTIVE AND OBJECTIVE BOX
SUBJECTIVE / OVERNIGHT EVENTS:  Today is hospital day 3d. There are no new issues or overnight events.   Did not endorse any headache, lightheadedness, vertigo, shortness of breathe, cough, chest pain, palpitations, tachycardia, abdominal pain, nausea, vomiting, diarrhea or constipation currently    HPI:  This is a 49 y/o male w/ PMHx sarcoidosis, optic neuritis and active smoking who presents for chest pain and black emesis. His symptoms first started with an episode of emesis yesterdat which was yellow-colored. Today, he started vomiting several times, and at the 5th episode, the color of the vomitus turned from yellow to a dark, black-arleth color. Around that time, he also started to develop chest pain as well. The chest pain was in the middle of his chest, radiated to the left side of his neck, squeezing in quality, and was constant. Nothing relieved or worsened the pain, and he didn't have any associated shortness of breath, dyspnea on exertion, diaphoresis, or palpitations. He doesn't take any medications. Is currently an active smoker, is a 15-pack year smoker and smokes 1/2 pack a day currently. Decided to come to the ED for further evaluation.  Of note, he states he had a lower GI bleed over 2 decades ago and required a colonoscopy, hasn't had one since then. His sarcoidosis was diagnosed 3 years ago in [place] when an MRI of his brain which was being done for optic neuritis captured the top of his lungs. He had a mediastinoscopy w/ biopsy which confirmed the diagnosis. He doesn't take any medications for it, said he has taken prednisone in the past for a flare-up. Currently lives in [facility], says he follows with [facility].    In the ED, he was afebrile and hemodynamically stable, saturating well on RA. CBC w/ normocytic anemia of 11.5, CMP grossly wnl. Troponin 9-> 10, CKMB 4.7->4.3, CPK 2.6%, pro-. UA w/ specific gravity >1.03. Urine drug screen negative. CTA chest/abdomen/pelvis was done, which was negative for dissection, positive for coronary calcifications, and wall thickening of the GE junction and gastric cardia. ECG showing NSR w/ LBBB, no prior comparisons available. Given ASA 324mg, SL NTG, IVP pantoprazole 80mg, IVP Pepcid 20mg, and started on pantoprazole drip in the ED. (27 Mar 2024 23:44)    MEDICATIONS  (STANDING):  chlorhexidine 2% Cloths 1 Application(s) Topical daily  lidocaine   4% Patch 1 Patch Transdermal once  pantoprazole    Tablet 40 milliGRAM(s) Oral two times a day    MEDICATIONS  (PRN):  acetaminophen     Tablet .. 650 milliGRAM(s) Oral every 6 hours PRN Temp greater or equal to 38C (100.4F), Mild Pain (1 - 3)  lidocaine   4% Patch 1 Patch Transdermal daily PRN flank pain  ondansetron Injectable 4 milliGRAM(s) IV Push every 8 hours PRN Nausea and/or Vomiting  oxyCODONE    IR 5 milliGRAM(s) Oral every 6 hours PRN Moderate Pain (4 - 6)    HOME MEDICATIONS:    PHYSICAL EXAM  Vital Signs Last 24 Hrs  T(C): 36.8 (30 Mar 2024 11:20), Max: 36.8 (29 Mar 2024 20:30)  T(F): 98.2 (30 Mar 2024 11:20), Max: 98.3 (29 Mar 2024 20:30)  HR: 67 (30 Mar 2024 11:20) (56 - 98)  BP: 147/84 (30 Mar 2024 11:20) (128/69 - 158/80)  BP(mean): 85 (29 Mar 2024 18:01) (85 - 85)  RR: 18 (30 Mar 2024 11:20) (14 - 19)  SpO2: 96% (30 Mar 2024 11:20) (95% - 99%)    Parameters below as of 30 Mar 2024 11:20  Patient On (Oxygen Delivery Method): room air        03-29-24 @ 07:01  -  03-30-24 @ 07:00  --------------------------------------------------------  IN: 0 mL / OUT: 1500 mL / NET: -1500 mL      CONSTITUTIONAL: Well-groomed, in no apparent distress;  EYES: No conjunctival or scleral injection, non-icteric;  ENMT: No external nasal lesions; Normal outer ears;  NECK: Trachea midline;  RESPIRATORY: Normal respiratory effort; Decreased breathe sounds bilaterally without wheeze/rhonchi/rales;  CARDIOVASCULAR: Regular rate and rhythm;  GASTROINTESTINAL: Non-distended; No palpable masses; No rebound/guarding;  EXTREMITIES:  No lower extremity edema;  NEUROLOGY: A+O to person, place, and time; Does respond to commands appropriately;  PSYCHIATRY: Mood and Affect appropriate    LABS:                        12.1   6.03  )-----------( 384      ( 30 Mar 2024 06:56 )             38.2     03-30    139  |  104  |  19  ----------------------------<  117<H>  3.8   |  20<L>  |  0.90    Ca    9.6      30 Mar 2024 06:58  Mg     1.8     03-30    TPro  7.6  /  Alb  4.2  /  TBili  0.2  /  DBili  x   /  AST  16  /  ALT  13  /  AlkPhos  91  03-30          Urinalysis Basic - ( 30 Mar 2024 06:58 )    Color: x / Appearance: x / SG: x / pH: x  Gluc: 117 mg/dL / Ketone: x  / Bili: x / Urobili: x   Blood: x / Protein: x / Nitrite: x   Leuk Esterase: x / RBC: x / WBC x   Sq Epi: x / Non Sq Epi: x / Bacteria: x        Culture - Urine (collected 27 Mar 2024 21:33)  Source: Clean Catch Clean Catch (Midstream)  Final Report (28 Mar 2024 23:29):    <10,000 CFU/mL Normal Urogenital [place]          RADIOLOGY & ADDITIONAL TESTS:  EKG    Xray Chest 1 View- PORTABLE-Urgent:   ACC: [id number] EXAM:  XR CHEST PORTABLE URGENT 1V   ORDERED BY:  RENETTA BENITEZ     PROCEDURE DATE:  03/27/2024          INTERPRETATION:  CLINICAL INDICATION: Chest Pain    TECHNIQUE: Single frontal, portable view of the chest was obtained.    COMPARISON: None.    FINDINGS:    The heart size is normal.  The lungs are clear.  No pneumothorax or pleural effusion.    IMPRESSION:  Clear lungs.    --- End of Report ---           MILTON RATLIFF MD; Resident Radiologist  This document has been electronically signed.  ROYA MEYER MD; Attending Radiologist  This document has been electronically signed. Mar 28 2024  9:39AM (03-27-24 @ 22:25)  CT Angio Abdomen and Pelvis w/ IV Cont:   ACC: [id number] EXAM:  CT ANGIO CHEST AORTA WAWIC   ORDERED BY: SHASTA RIVERA     ACC: [id number] EXAM:  CT ANGIO ABD PELV (W)AW IC   ORDERED BY: SHASTA RIVERA     PROCEDURE DATE:  03/27/2024          INTERPRETATION:  CLINICAL INFORMATION: Chest and abdominal pain with   associated chest pressure. Black emesis.    COMPARISON: None.    CONTRAST/COMPLICATIONS:  IV Contrast: IV contrast documented in unlinked concurrent exam   (accession [id number]), Omnipaque 350 (accession [id number])  90 cc   administered   10 cc discarded  Oral Contrast: NONE  Complications: None reported at time of study completion    PROCEDURE:  CT Angiography of the Chest, Abdomen and Pelvis.  Precontrast imaging was performed through the chest followed by arterial   phase imaging of the chest, abdomen and pelvis.  Sagittal and coronal reformats were performed as well as 3D (MIP)   reconstructions.    FINDINGS:    AORTA: There is no aortic aneurysm or dissection. Minimal atherosclerotic   calcifications of the distal abdominal aorta are appreciated. The   thoracic and mesenteric branch vessels are patent, without stenosis.    CHEST:  LUNGS AND LARGE AIRWAYS: Patent central airways. No confluent airspace   opacities. Lobulated 1.4 x 0.8 cm density is seen along thesuperior   aspect of the right major fissure which appears to be on both sides of   the fissure. This is either several adjacent nodules or a single lobular   nodule. Additional 5 mm right lower lobe pulmonary nodule (304:82).   Calcified granuloma of the left upper lobe. There is linear scarring   versus atelectatic changes of the anterior right middle lobe and lingula.   Linear peripheral density of the right lower lobe is a small nodular   component measuring 8 mm.  PLEURA: No pleural effusion.  VESSELS: Coronary calcifications..  HEART: Heart size is normal. No pericardial effusion.  MEDIASTINUM AND ARTEM: No lymphadenopathy.  CHEST WALL AND LOWER NECK: Within normal limits.    ABDOMEN AND PELVIS:  LIVER: Within normal limits.  BILE DUCTS:Normal caliber.  GALLBLADDER: Within normal limits.  SPLEEN: Splenectomy.  PANCREAS: Within normal limits.  ADRENALS: Within normal limits.  KIDNEYS/URETERS: Within normal limits.    BLADDER: Minimally distended though appears mildly thick-walled.  REPRODUCTIVE ORGANS: Prostate within normal limits.    BOWEL: No bowel obstruction. Appendix is normal. Moderate stool burden   within the proximal colon may be related to constipation. There is wall   thickening of the distal esophagus with either mild wall edema or trace   paraesophageal fluid on the right. There is also questionable thickening   of the GE junction and the gastric cardia. Surgical clips are seen along   the posterior cardia/fundus. It is unclear if this is related to   splenectomy or prior gastric surgery.  PERITONEUM: No ascites.  VESSELS: Minimal atherosclerotic changes of the distal abdominal aorta..  RETROPERITONEUM/LYMPH NODES: No pathologically enlarged lymph nodes.    Nonenlarged nonspecific inguinal lymph nodes appreciated, more numerous   on the right.  ABDOMINAL WALL: Slight stranding seen in the right inguinal region   possibly related to previous vascular access.  BONES: Minimal degenerative changes.    IMPRESSION:  No aortic aneurysm or dissection.    Coronary calcifications.    Distal esophageal wall thickening question of mural edema versus trace   paraesophageal fluid. Correlate clinically for acute esophagitis. Other   mucosal abnormality not excluded.    There is also associated wall thickening of the GE junction and gastric   cardia which may be related to focal underdistention, postsurgical   changes or sequelae of focal gastritis. Endoscopic evaluation may be   warranted.    Diffuse wall thickening of the urinary bladder which may be related to   underdistention. Cystitis may appear similar. Correlate with urinalysis.    Multiple pulmonary nodules as above which are indeterminate nature, the   largest along the right major fissure involving both sides of the   fissure. Comparison to previous exams, if available elsewhere is advised   to assess for stability. Otherwise, consider further correlation with   PET/CT or close continued surveillance.        --- End of Report ---            ENRIQUE RICH M.D., Attending Radiologist  This documenthas been electronically signed. Mar 27 2024  9:12PM (03-27-24 @ 21:03)

## 2024-03-31 PROCEDURE — 99232 SBSQ HOSP IP/OBS MODERATE 35: CPT

## 2024-03-31 RX ADMIN — OXYCODONE HYDROCHLORIDE 5 MILLIGRAM(S): 5 TABLET ORAL at 20:45

## 2024-03-31 RX ADMIN — OXYCODONE HYDROCHLORIDE 5 MILLIGRAM(S): 5 TABLET ORAL at 15:21

## 2024-03-31 RX ADMIN — OXYCODONE HYDROCHLORIDE 5 MILLIGRAM(S): 5 TABLET ORAL at 01:49

## 2024-03-31 RX ADMIN — OXYCODONE HYDROCHLORIDE 5 MILLIGRAM(S): 5 TABLET ORAL at 14:51

## 2024-03-31 RX ADMIN — PANTOPRAZOLE SODIUM 40 MILLIGRAM(S): 20 TABLET, DELAYED RELEASE ORAL at 05:44

## 2024-03-31 RX ADMIN — OXYCODONE HYDROCHLORIDE 5 MILLIGRAM(S): 5 TABLET ORAL at 22:00

## 2024-03-31 RX ADMIN — PANTOPRAZOLE SODIUM 40 MILLIGRAM(S): 20 TABLET, DELAYED RELEASE ORAL at 17:55

## 2024-03-31 RX ADMIN — OXYCODONE HYDROCHLORIDE 5 MILLIGRAM(S): 5 TABLET ORAL at 08:49

## 2024-03-31 RX ADMIN — OXYCODONE HYDROCHLORIDE 5 MILLIGRAM(S): 5 TABLET ORAL at 08:19

## 2024-03-31 RX ADMIN — CHLORHEXIDINE GLUCONATE 1 APPLICATION(S): 213 SOLUTION TOPICAL at 13:32

## 2024-03-31 NOTE — PROGRESS NOTE ADULT - PROBLEM SELECTOR PLAN 1
- CTA abdomen/pelvis showing distal esophageal wall thickening and associated wall thickening of the GE junction and gastric cardia which may be related to focal underdistention, postsurgical changes or sequelae of focal gastritis  - C/w PO Pantoprazole  - monitor CBC daily ; transfuse for Hb < 7  - s/p EGD with gastritis. OP f/u with GI

## 2024-03-31 NOTE — PROGRESS NOTE ADULT - SUBJECTIVE AND OBJECTIVE BOX
SUBJECTIVE / OVERNIGHT EVENTS:  Today is hospital day 4d. There are no new issues or overnight events.   Did not endorse any headache, lightheadedness, vertigo, shortness of breathe, cough, chest pain, palpitations, tachycardia, abdominal pain, nausea, vomiting, diarrhea or constipation currently    HPI:  This is a 49 y/o male w/ PMHx sarcoidosis, optic neuritis and active smoking who presents for chest pain and black emesis. His symptoms first started with an episode of emesis yesterdat which was yellow-colored. Today, he started vomiting several times, and at the 5th episode, the color of the vomitus turned from yellow to a dark, black-arleth color. Around that time, he also started to develop chest pain as well. The chest pain was in the middle of his chest, radiated to the left side of his neck, squeezing in quality, and was constant. Nothing relieved or worsened the pain, and he didn't have any associated shortness of breath, dyspnea on exertion, diaphoresis, or palpitations. He doesn't take any medications. Is currently an active smoker, is a 15-pack year smoker and smokes 1/2 pack a day currently. Decided to come to the ED for further evaluation.  Of note, he states he had a lower GI bleed over 2 decades ago and required a colonoscopy, hasn't had one since then. His sarcoidosis was diagnosed 3 years ago in [place] when an MRI of his brain which was being done for optic neuritis captured the top of his lungs. He had a mediastinoscopy w/ biopsy which confirmed the diagnosis. He doesn't take any medications for it, said he has taken prednisone in the past for a flare-up. Currently lives in [facility], says he follows with [facility].    In the ED, he was afebrile and hemodynamically stable, saturating well on RA. CBC w/ normocytic anemia of 11.5, CMP grossly wnl. Troponin 9-> 10, CKMB 4.7->4.3, CPK 2.6%, pro-. UA w/ specific gravity >1.03. Urine drug screen negative. CTA chest/abdomen/pelvis was done, which was negative for dissection, positive for coronary calcifications, and wall thickening of the GE junction and gastric cardia. ECG showing NSR w/ LBBB, no prior comparisons available. Given ASA 324mg, SL NTG, IVP pantoprazole 80mg, IVP Pepcid 20mg, and started on pantoprazole drip in the ED. (27 Mar 2024 23:44)    MEDICATIONS  (STANDING):  chlorhexidine 2% Cloths 1 Application(s) Topical daily  pantoprazole    Tablet 40 milliGRAM(s) Oral two times a day    MEDICATIONS  (PRN):  acetaminophen     Tablet .. 650 milliGRAM(s) Oral every 6 hours PRN Temp greater or equal to 38C (100.4F), Mild Pain (1 - 3)  lidocaine   4% Patch 1 Patch Transdermal daily PRN flank pain  ondansetron Injectable 4 milliGRAM(s) IV Push every 8 hours PRN Nausea and/or Vomiting  oxyCODONE    IR 5 milliGRAM(s) Oral every 6 hours PRN Moderate Pain (4 - 6)    HOME MEDICATIONS:    PHYSICAL EXAM  Vital Signs Last 24 Hrs  T(C): 36.8 (31 Mar 2024 05:45), Max: 37.2 (30 Mar 2024 20:28)  T(F): 98.2 (31 Mar 2024 05:45), Max: 98.9 (30 Mar 2024 20:28)  HR: 70 (31 Mar 2024 05:45) (62 - 84)  BP: 132/75 (31 Mar 2024 05:45) (132/75 - 147/84)  BP(mean): --  RR: 18 (31 Mar 2024 05:45) (18 - 18)  SpO2: 97% (31 Mar 2024 05:45) (96% - 98%)    Parameters below as of 31 Mar 2024 05:45  Patient On (Oxygen Delivery Method): room air        03-30-24 @ 07:01  -  03-31-24 @ 07:00  --------------------------------------------------------  IN: 480 mL / OUT: 425 mL / NET: 55 mL      CONSTITUTIONAL: Well-groomed, in no apparent distress;  EYES: No conjunctival or scleral injection, non-icteric;  ENMT: No external nasal lesions; Normal outer ears;  NECK: Trachea midline;  RESPIRATORY: Normal respiratory effort; Decreased breathe sounds bilaterally without wheeze/rhonchi/rales;  CARDIOVASCULAR: Regular rate and rhythm;  GASTROINTESTINAL: Non-distended; No palpable masses; No rebound/guarding;  EXTREMITIES:  No lower extremity edema;  NEUROLOGY: A+O to person, place, and time; Does respond to commands appropriately;  PSYCHIATRY: Mood and Affect appropriate    LABS:                        12.1   6.03  )-----------( 384      ( 30 Mar 2024 06:56 )             38.2     03-30    139  |  104  |  19  ----------------------------<  117<H>  3.8   |  20<L>  |  0.90    Ca    9.6      30 Mar 2024 06:58  Mg     1.8     03-30    TPro  7.6  /  Alb  4.2  /  TBili  0.2  /  DBili  x   /  AST  16  /  ALT  13  /  AlkPhos  91  03-30          Urinalysis Basic - ( 30 Mar 2024 06:58 )    Color: x / Appearance: x / SG: x / pH: x  Gluc: 117 mg/dL / Ketone: x  / Bili: x / Urobili: x   Blood: x / Protein: x / Nitrite: x   Leuk Esterase: x / RBC: x / WBC x   Sq Epi: x / Non Sq Epi: x / Bacteria: x            RADIOLOGY & ADDITIONAL TESTS:  EKG  12 Lead ECG:   Ventricular Rate 80 BPM    Atrial Rate 80 BPM    P-R Interval 156 ms    QRS Duration 148 ms    Q-T Interval 426 ms    QTC Calculation(Bazett) 491 ms    P Axis 48 degrees    R Axis 50 degrees    T Axis 261 degrees    Diagnosis Line NORMAL SINUS RHYTHM  LEFT BUNDLE BRANCH BLOCK  ABNORMAL ECG  NO PREVIOUS ECGS AVAILABLE  Confirmed by SHERITA CORADO MD ([id number]) on 3/30/2024 6:34:12 PM (03-27-24 @ 18:04)    Xray Chest 1 View- PORTABLE-Urgent:   ACC: [id number] EXAM:  XR CHEST PORTABLE URGENT 1V   ORDERED BY:  RENETTA BENITEZ     PROCEDURE DATE:  03/27/2024          INTERPRETATION:  CLINICAL INDICATION: Chest Pain    TECHNIQUE: Single frontal, portable view of the chest was obtained.    COMPARISON: None.    FINDINGS:    The heart size is normal.  The lungs are clear.  No pneumothorax or pleural effusion.    IMPRESSION:  Clear lungs.    --- End of Report ---           MILTON RATLIFF MD; Resident Radiologist  This document has been electronically signed.  ROYA MEYER MD; Attending Radiologist  This document has been electronically signed. Mar 28 2024  9:39AM (03-27-24 @ 22:25)  CT Angio Abdomen and Pelvis w/ IV Cont:   ACC: [id number] EXAM:  CT ANGIO CHEST AORTA WAWIC   ORDERED BY: SHASTA RIVERA     ACC: [id number] EXAM:  CT ANGIO ABD PELV (W)AW IC   ORDERED BY: SHASTA RIVERA     PROCEDURE DATE:  03/27/2024          INTERPRETATION:  CLINICAL INFORMATION: Chest and abdominal pain with   associated chest pressure. Black emesis.    COMPARISON: None.    CONTRAST/COMPLICATIONS:  IV Contrast: IV contrast documented in unlinked concurrent exam   (accession [id number]), Omnipaque 350 (accession [id number])  90 cc   administered   10 cc discarded  Oral Contrast: NONE  Complications: None reported at time of study completion    PROCEDURE:  CT Angiography of the Chest, Abdomen and Pelvis.  Precontrast imaging was performed through the chest followed by arterial   phase imaging of the chest, abdomen and pelvis.  Sagittal and coronal reformats were performed as well as 3D (MIP)   reconstructions.    FINDINGS:    AORTA: There is no aortic aneurysm or dissection. Minimal atherosclerotic   calcifications of the distal abdominal aorta are appreciated. The   thoracic and mesenteric branch vessels are patent, without stenosis.    CHEST:  LUNGS AND LARGE AIRWAYS: Patent central airways. No confluent airspace   opacities. Lobulated 1.4 x 0.8 cm density is seen along thesuperior   aspect of the right major fissure which appears to be on both sides of   the fissure. This is either several adjacent nodules or a single lobular   nodule. Additional 5 mm right lower lobe pulmonary nodule (304:82).   Calcified granuloma of the left upper lobe. There is linear scarring   versus atelectatic changes of the anterior right middle lobe and lingula.   Linear peripheral density of the right lower lobe is a small nodular   component measuring 8 mm.  PLEURA: No pleural effusion.  VESSELS: Coronary calcifications..  HEART: Heart size is normal. No pericardial effusion.  MEDIASTINUM AND ARTEM: No lymphadenopathy.  CHEST WALL AND LOWER NECK: Within normal limits.    ABDOMEN AND PELVIS:  LIVER: Within normal limits.  BILE DUCTS:Normal caliber.  GALLBLADDER: Within normal limits.  SPLEEN: Splenectomy.  PANCREAS: Within normal limits.  ADRENALS: Within normal limits.  KIDNEYS/URETERS: Within normal limits.    BLADDER: Minimally distended though appears mildly thick-walled.  REPRODUCTIVE ORGANS: Prostate within normal limits.    BOWEL: No bowel obstruction. Appendix is normal. Moderate stool burden   within the proximal colon may be related to constipation. There is wall   thickening of the distal esophagus with either mild wall edema or trace   paraesophageal fluid on the right. There is also questionable thickening   of the GE junction and the gastric cardia. Surgical clips are seen along   the posterior cardia/fundus. It is unclear if this is related to   splenectomy or prior gastric surgery.  PERITONEUM: No ascites.  VESSELS: Minimal atherosclerotic changes of the distal abdominal aorta..  RETROPERITONEUM/LYMPH NODES: No pathologically enlarged lymph nodes.    Nonenlarged nonspecific inguinal lymph nodes appreciated, more numerous   on the right.  ABDOMINAL WALL: Slight stranding seen in the right inguinal region   possibly related to previous vascular access.  BONES: Minimal degenerative changes.    IMPRESSION:  No aortic aneurysm or dissection.    Coronary calcifications.    Distal esophageal wall thickening question of mural edema versus trace   paraesophageal fluid. Correlate clinically for acute esophagitis. Other   mucosal abnormality not excluded.    There is also associated wall thickening of the GE junction and gastric   cardia which may be related to focal underdistention, postsurgical   changes or sequelae of focal gastritis. Endoscopic evaluation may be   warranted.    Diffuse wall thickening of the urinary bladder which may be related to   underdistention. Cystitis may appear similar. Correlate with urinalysis.    Multiple pulmonary nodules as above which are indeterminate nature, the   largest along the right major fissure involving both sides of the   fissure. Comparison to previous exams, if available elsewhere is advised   to assess for stability. Otherwise, consider further correlation with   PET/CT or close continued surveillance.        --- End of Report ---            ENRIQUE RICH M.D., Attending Radiologist  This documenthas been electronically signed. Mar 27 2024  9:12PM (03-27-24 @ 21:03)

## 2024-03-31 NOTE — PROGRESS NOTE ADULT - TIME BILLING
- Ordering, reviewing, and/or interpreting labs, testing, and/or imaging  - Independently obtaining a review of systems and performing a physical exam  - Reviewing prior records and where necessary, outpatient records  - Counselling and educating patient and/or family regarding interpretation of aforementioned items and plan of care
- Ordering, reviewing, and/or interpreting labs, testing, and/or imaging  - Independently obtaining a review of systems and performing a physical exam  - Reviewing prior records and where necessary, outpatient records  - Counselling and educating patient and/or family regarding interpretation of aforementioned items and plan of care

## 2024-04-01 LAB
HCT VFR BLD CALC: 37.9 % — LOW (ref 39–50)
HGB BLD-MCNC: 12.1 G/DL — LOW (ref 13–17)
MCHC RBC-ENTMCNC: 27.3 PG — SIGNIFICANT CHANGE UP (ref 27–34)
MCHC RBC-ENTMCNC: 31.9 GM/DL — LOW (ref 32–36)
MCV RBC AUTO: 85.6 FL — SIGNIFICANT CHANGE UP (ref 80–100)
MRSA PCR RESULT.: SIGNIFICANT CHANGE UP
NRBC # BLD: 0 /100 WBCS — SIGNIFICANT CHANGE UP (ref 0–0)
PLATELET # BLD AUTO: 346 K/UL — SIGNIFICANT CHANGE UP (ref 150–400)
RBC # BLD: 4.43 M/UL — SIGNIFICANT CHANGE UP (ref 4.2–5.8)
RBC # FLD: 14.2 % — SIGNIFICANT CHANGE UP (ref 10.3–14.5)
S AUREUS DNA NOSE QL NAA+PROBE: DETECTED
WBC # BLD: 5.09 K/UL — SIGNIFICANT CHANGE UP (ref 3.8–10.5)
WBC # FLD AUTO: 5.09 K/UL — SIGNIFICANT CHANGE UP (ref 3.8–10.5)

## 2024-04-01 PROCEDURE — 99233 SBSQ HOSP IP/OBS HIGH 50: CPT | Mod: GC

## 2024-04-01 PROCEDURE — 99232 SBSQ HOSP IP/OBS MODERATE 35: CPT

## 2024-04-01 RX ORDER — ACETAMINOPHEN 500 MG
1000 TABLET ORAL ONCE
Refills: 0 | Status: COMPLETED | OUTPATIENT
Start: 2024-04-01 | End: 2024-04-01

## 2024-04-01 RX ORDER — LANOLIN ALCOHOL/MO/W.PET/CERES
3 CREAM (GRAM) TOPICAL ONCE
Refills: 0 | Status: COMPLETED | OUTPATIENT
Start: 2024-04-01 | End: 2024-04-01

## 2024-04-01 RX ADMIN — Medication 1000 MILLIGRAM(S): at 05:30

## 2024-04-01 RX ADMIN — Medication 400 MILLIGRAM(S): at 04:56

## 2024-04-01 RX ADMIN — OXYCODONE HYDROCHLORIDE 5 MILLIGRAM(S): 5 TABLET ORAL at 03:45

## 2024-04-01 RX ADMIN — OXYCODONE HYDROCHLORIDE 5 MILLIGRAM(S): 5 TABLET ORAL at 21:58

## 2024-04-01 RX ADMIN — OXYCODONE HYDROCHLORIDE 5 MILLIGRAM(S): 5 TABLET ORAL at 16:20

## 2024-04-01 RX ADMIN — OXYCODONE HYDROCHLORIDE 5 MILLIGRAM(S): 5 TABLET ORAL at 23:00

## 2024-04-01 RX ADMIN — Medication 3 MILLIGRAM(S): at 22:05

## 2024-04-01 RX ADMIN — OXYCODONE HYDROCHLORIDE 5 MILLIGRAM(S): 5 TABLET ORAL at 15:50

## 2024-04-01 RX ADMIN — CHLORHEXIDINE GLUCONATE 1 APPLICATION(S): 213 SOLUTION TOPICAL at 13:59

## 2024-04-01 RX ADMIN — OXYCODONE HYDROCHLORIDE 5 MILLIGRAM(S): 5 TABLET ORAL at 02:30

## 2024-04-01 RX ADMIN — OXYCODONE HYDROCHLORIDE 5 MILLIGRAM(S): 5 TABLET ORAL at 10:14

## 2024-04-01 RX ADMIN — OXYCODONE HYDROCHLORIDE 5 MILLIGRAM(S): 5 TABLET ORAL at 09:44

## 2024-04-01 RX ADMIN — PANTOPRAZOLE SODIUM 40 MILLIGRAM(S): 20 TABLET, DELAYED RELEASE ORAL at 05:27

## 2024-04-01 RX ADMIN — PANTOPRAZOLE SODIUM 40 MILLIGRAM(S): 20 TABLET, DELAYED RELEASE ORAL at 17:14

## 2024-04-01 NOTE — PROGRESS NOTE ADULT - SUBJECTIVE AND OBJECTIVE BOX
Patient seen and examined at bedside.    Overnight Events:   NAEON  No events on tele     REVIEW OF SYSTEMS:  All other review of systems is negative unless indicated above.            Current Meds:  acetaminophen     Tablet .. 650 milliGRAM(s) Oral every 6 hours PRN  chlorhexidine 2% Cloths 1 Application(s) Topical daily  lidocaine   4% Patch 1 Patch Transdermal daily PRN  ondansetron Injectable 4 milliGRAM(s) IV Push every 8 hours PRN  oxyCODONE    IR 5 milliGRAM(s) Oral every 6 hours PRN  pantoprazole    Tablet 40 milliGRAM(s) Oral two times a day      Vitals:  T(F): 98.4 (04-01), Max: 98.4 (03-31)  HR: 67 (04-01) (64 - 86)  BP: 133/75 (04-01) (130/85 - 147/88)  RR: 18 (04-01)  SpO2: 97% (04-01)  I&O's Summary    31 Mar 2024 07:01  -  01 Apr 2024 07:00  --------------------------------------------------------  IN: 480 mL / OUT: 0 mL / NET: 480 mL        Physical Exam:  Appearance: No acute distress; well appearing  Eyes:  EOMI  HEENT: Normal oral mucosa  Cardiovascular: RRR, S1, S2, no murmurs, rubs, or gallops; no edema; no JVD  Respiratory: Clear to auscultation bilaterally  Gastrointestinal: soft, non-tender, non-distended  Musculoskeletal: No clubbing;  Neurologic: Non-focal  Psychiatry: AAOx3, mood & affect appropriate                          12.1   5.09  )-----------( 346      ( 01 Apr 2024 07:24 )             37.9     Echo:  CONCLUSIONS:      1. Left ventricular systolic function is moderately decreased with an ejection fraction visually estimated at 40 %.   2. Entire apex, entire inferior septum, entire inferior wall, and mid and apical anterior septum are abnormal.        IMPRESSION:    CT coronary angiography shows: (Overall limited evaluation of the   coronary arteries due to poor contrast opacification)  LMCA: Patent.  LAD: Eccentric mixed calcified and noncalcified plaques within the   proximal segment result in minimal (less than 30%) luminal narrowing.   Eccentric mixed calcified and noncalcified plaque within the mid segment   result in mild-to-moderate (about 50%) luminal narrowing.  LCx: Patent. Eccentric mixed calcified and noncalcified plaques in the   proximal segment of a prominent obtuse marginal artery result in minimal   (less than 30%) luminal narrowing.  RCA: Dominant vessel, with plaques within the proximal segment resulting   in minimal (less than 30%) luminal narrowing.

## 2024-04-01 NOTE — PROGRESS NOTE ADULT - SUBJECTIVE AND OBJECTIVE BOX
[facility] Division of Hospital Medicine  Shira Rowe MD  AVailable on TEAMS 8a-8p    Patient is a 48y old  Male who presents with a chief complaint of Chest pain, coffee-ground emesis (01 Apr 2024 10:43)      SUBJECTIVE / OVERNIGHT EVENTS: No acute events. Had one dark red BM overnight  ADDITIONAL REVIEW OF SYSTEMS: otherwise negative    MEDICATIONS  (STANDING):  chlorhexidine 2% Cloths 1 Application(s) Topical daily  pantoprazole    Tablet 40 milliGRAM(s) Oral two times a day    MEDICATIONS  (PRN):  acetaminophen     Tablet .. 650 milliGRAM(s) Oral every 6 hours PRN Temp greater or equal to 38C (100.4F), Mild Pain (1 - 3)  lidocaine   4% Patch 1 Patch Transdermal daily PRN flank pain  ondansetron Injectable 4 milliGRAM(s) IV Push every 8 hours PRN Nausea and/or Vomiting  oxyCODONE    IR 5 milliGRAM(s) Oral every 6 hours PRN Moderate Pain (4 - 6)      CAPILLARY BLOOD GLUCOSE        I&O's Summary    31 Mar 2024 07:01  -  01 Apr 2024 07:00  --------------------------------------------------------  IN: 480 mL / OUT: 0 mL / NET: 480 mL    01 Apr 2024 07:01  -  01 Apr 2024 15:45  --------------------------------------------------------  IN: 0 mL / OUT: 350 mL / NET: -350 mL        PHYSICAL EXAM:  Vital Signs Last 24 Hrs  T(C): 36.7 (01 Apr 2024 11:48), Max: 36.9 (31 Mar 2024 20:31)  T(F): 98.1 (01 Apr 2024 11:48), Max: 98.4 (31 Mar 2024 20:31)  HR: 60 (01 Apr 2024 11:48) (60 - 86)  BP: 147/84 (01 Apr 2024 11:48) (130/85 - 147/84)  BP(mean): --  RR: 18 (01 Apr 2024 11:48) (18 - 18)  SpO2: 99% (01 Apr 2024 11:48) (97% - 99%)    Parameters below as of 01 Apr 2024 11:48  Patient On (Oxygen Delivery Method): room air      CONSTITUTIONAL: NAD, well-developed, well-groomed  EYES: PERRLA; conjunctiva and sclera clear  ENMT: Moist oral mucosa, no pharyngeal injection or exudates  NECK: Supple, no palpable masses; no thyromegaly  RESPIRATORY: Normal respiratory effort; lungs are clear to auscultation bilaterally  CARDIOVASCULAR: Regular rate and rhythm, normal S1 and S2, no murmur/rub/gallop; No lower extremity edema  ABDOMEN: Nontender to palpation, normoactive bowel sounds, no rebound/guarding; No hepatosplenomegaly  MUSCULOSKELETAL:  No clubbing or cyanosis of digits; no joint swelling or tenderness to palpation  PSYCH: A+O to person, place, and time; affect appropriate  NEUROLOGY: CN 2-12 are intact and symmetric; no gross sensory deficits   SKIN: No rashes; no palpable lesions    LABS: reviewed                        12.1   5.09  )-----------( 346      ( 01 Apr 2024 07:24 )             37.9

## 2024-04-01 NOTE — PROGRESS NOTE ADULT - ASSESSMENT
49 yo man with hx of pulmonary sarcoidosis, active tobacco use, optic neuritis, and poor medical contact who presents to the ED with chest pain in s/o emesis. His trop is negative, EKG w/ LBB unclear if new. Symtpoms are not c/w ACS. Coronary CTA with non-obstructive disease, not likely to be the cause of his cardiomyopathy. Must r/o cardaic sarcoid.     Recommendations  - Cardiac MRI  - High intensity statin  - ASA 81 mg daily  - GDMT: Start losartan 25 mg, aldactone 25  - SGLT2i when no further procedures planned  - Beta blocker limited by HR    Please see attending attestation for final recommendations        Josiah Ferguson MD  Cardiology Fellow     All Cardiology service information can be found 24/7 on [email or web address], password: [email or web address] 49 yo man with hx of pulmonary sarcoidosis, active tobacco use, optic neuritis, and poor medical contact who presents to the ED with chest pain in s/o emesis. His trop is negative, EKG w/ LBB unclear if new. Symtpoms are not c/w ACS. Coronary CTA with non-obstructive disease, not likely to be the cause of his cardiomyopathy. Must r/o cardaic sarcoid.     Recommendations  - Cardiac MRI  - High intensity statin  - ASA 81 mg daily  - GDMT: Start losartan 25 mg, aldactone 25  - SGLT2i when no further procedures planned  - Beta blocker limited by HR      Josiah Ferguson MD  Cardiology Fellow     All Cardiology service information can be found 24/7 on [email or web address], password: [email or web address]

## 2024-04-02 LAB
HCT VFR BLD CALC: 39.2 % — SIGNIFICANT CHANGE UP (ref 39–50)
HGB BLD-MCNC: 12.7 G/DL — LOW (ref 13–17)
MCHC RBC-ENTMCNC: 27.1 PG — SIGNIFICANT CHANGE UP (ref 27–34)
MCHC RBC-ENTMCNC: 32.4 GM/DL — SIGNIFICANT CHANGE UP (ref 32–36)
MCV RBC AUTO: 83.6 FL — SIGNIFICANT CHANGE UP (ref 80–100)
NRBC # BLD: 0 /100 WBCS — SIGNIFICANT CHANGE UP (ref 0–0)
PLATELET # BLD AUTO: 390 K/UL — SIGNIFICANT CHANGE UP (ref 150–400)
RBC # BLD: 4.69 M/UL — SIGNIFICANT CHANGE UP (ref 4.2–5.8)
RBC # FLD: 14.1 % — SIGNIFICANT CHANGE UP (ref 10.3–14.5)
WBC # BLD: 4.54 K/UL — SIGNIFICANT CHANGE UP (ref 3.8–10.5)
WBC # FLD AUTO: 4.54 K/UL — SIGNIFICANT CHANGE UP (ref 3.8–10.5)

## 2024-04-02 PROCEDURE — 99233 SBSQ HOSP IP/OBS HIGH 50: CPT

## 2024-04-02 PROCEDURE — 99232 SBSQ HOSP IP/OBS MODERATE 35: CPT

## 2024-04-02 RX ORDER — LOSARTAN POTASSIUM 100 MG/1
25 TABLET, FILM COATED ORAL DAILY
Refills: 0 | Status: DISCONTINUED | OUTPATIENT
Start: 2024-04-02 | End: 2024-04-04

## 2024-04-02 RX ORDER — SPIRONOLACTONE 25 MG/1
25 TABLET, FILM COATED ORAL DAILY
Refills: 0 | Status: DISCONTINUED | OUTPATIENT
Start: 2024-04-02 | End: 2024-04-04

## 2024-04-02 RX ORDER — LANOLIN ALCOHOL/MO/W.PET/CERES
3 CREAM (GRAM) TOPICAL ONCE
Refills: 0 | Status: COMPLETED | OUTPATIENT
Start: 2024-04-02 | End: 2024-04-02

## 2024-04-02 RX ORDER — CYCLOBENZAPRINE HYDROCHLORIDE 10 MG/1
5 TABLET, FILM COATED ORAL THREE TIMES A DAY
Refills: 0 | Status: DISCONTINUED | OUTPATIENT
Start: 2024-04-02 | End: 2024-04-04

## 2024-04-02 RX ADMIN — Medication 3 MILLIGRAM(S): at 22:48

## 2024-04-02 RX ADMIN — CYCLOBENZAPRINE HYDROCHLORIDE 5 MILLIGRAM(S): 10 TABLET, FILM COATED ORAL at 15:01

## 2024-04-02 RX ADMIN — OXYCODONE HYDROCHLORIDE 5 MILLIGRAM(S): 5 TABLET ORAL at 04:22

## 2024-04-02 RX ADMIN — OXYCODONE HYDROCHLORIDE 5 MILLIGRAM(S): 5 TABLET ORAL at 17:51

## 2024-04-02 RX ADMIN — OXYCODONE HYDROCHLORIDE 5 MILLIGRAM(S): 5 TABLET ORAL at 05:30

## 2024-04-02 RX ADMIN — SPIRONOLACTONE 25 MILLIGRAM(S): 25 TABLET, FILM COATED ORAL at 11:39

## 2024-04-02 RX ADMIN — CHLORHEXIDINE GLUCONATE 1 APPLICATION(S): 213 SOLUTION TOPICAL at 11:26

## 2024-04-02 RX ADMIN — PANTOPRAZOLE SODIUM 40 MILLIGRAM(S): 20 TABLET, DELAYED RELEASE ORAL at 17:22

## 2024-04-02 RX ADMIN — OXYCODONE HYDROCHLORIDE 5 MILLIGRAM(S): 5 TABLET ORAL at 17:21

## 2024-04-02 RX ADMIN — OXYCODONE HYDROCHLORIDE 5 MILLIGRAM(S): 5 TABLET ORAL at 10:58

## 2024-04-02 RX ADMIN — CYCLOBENZAPRINE HYDROCHLORIDE 5 MILLIGRAM(S): 10 TABLET, FILM COATED ORAL at 22:48

## 2024-04-02 RX ADMIN — OXYCODONE HYDROCHLORIDE 5 MILLIGRAM(S): 5 TABLET ORAL at 10:28

## 2024-04-02 RX ADMIN — LOSARTAN POTASSIUM 25 MILLIGRAM(S): 100 TABLET, FILM COATED ORAL at 11:39

## 2024-04-02 RX ADMIN — PANTOPRAZOLE SODIUM 40 MILLIGRAM(S): 20 TABLET, DELAYED RELEASE ORAL at 05:44

## 2024-04-02 NOTE — PROGRESS NOTE ADULT - NSPROGADDITIONALINFOA_GEN_ALL_CORE
40 minutes spent on total encounter. The necessity of the time spent during the encounter on this date of service was due to:     - preparing to see the patient (eg, review of tests, documents)  - performing a medically appropriate examination and/or evaluation  - referring and communicating with other health care professionals  - documenting clinical information in the electronic or other health record  - care coordination.
pend - cMRI
time spent reviewing prior charts, meds, discussing plan with patient= 50 minutes    d/w ACP Rain
41 minutes spent on total encounter. The necessity of the time spent during the encounter on this date of service was due to:     - preparing to see the patient (eg, review of tests, documents)  - performing a medically appropriate examination and/or evaluation  - referring and communicating with other health care professionals  - documenting clinical information in the electronic or other health record  - care coordination.

## 2024-04-02 NOTE — PROGRESS NOTE ADULT - SUBJECTIVE AND OBJECTIVE BOX
Patient seen and examined at bedside.    Overnight Events:   NAEON  Tele - NSR  no acute complaints     REVIEW OF SYSTEMS:  All other review of systems is negative unless indicated above.            Current Meds:  acetaminophen     Tablet .. 650 milliGRAM(s) Oral every 6 hours PRN  chlorhexidine 2% Cloths 1 Application(s) Topical daily  lidocaine   4% Patch 1 Patch Transdermal daily PRN  ondansetron Injectable 4 milliGRAM(s) IV Push every 8 hours PRN  oxyCODONE    IR 5 milliGRAM(s) Oral every 6 hours PRN  pantoprazole    Tablet 40 milliGRAM(s) Oral two times a day      Vitals:  T(F): 97.5 (04-02), Max: 98.1 (04-01)  HR: 61 (04-02) (60 - 93)  BP: 122/74 (04-02) (122/71 - 147/84)  RR: 18 (04-02)  SpO2: 98% (04-02)  I&O's Summary    01 Apr 2024 07:01  -  02 Apr 2024 07:00  --------------------------------------------------------  IN: 720 mL / OUT: 350 mL / NET: 370 mL        Physical Exam:  Appearance: No acute distress; well appearing  Eyes:  EOMI  HEENT: Normal oral mucosa  Cardiovascular: RRR, S1, S2, no murmurs, rubs, or gallops; no edema; no JVD  Respiratory: Clear to auscultation bilaterally  Gastrointestinal: soft, non-tender, non-distended  Musculoskeletal: No clubbing;  Neurologic: Non-focal  Psychiatry: AAOx3, mood & affect appropriate                          12.7   4.54  )-----------( 390      ( 02 Apr 2024 06:54 )             39.2

## 2024-04-02 NOTE — PROGRESS NOTE ADULT - ASSESSMENT
47 yo man with hx of pulmonary sarcoidosis, active tobacco use, optic neuritis, and poor medical contact who presents to the ED with chest pain in s/o emesis. His trop is negative, EKG w/ LBB unclear if new. Symtpoms are not c/w ACS. Coronary CTA with non-obstructive disease, not likely to be the cause of his cardiomyopathy. Must r/o cardaic sarcoid.     Recommendations  - Cardiac MRI  - High intensity statin  - ASA 81 mg daily  - GDMT: Start losartan 25 mg, aldactone 25  - SGLT2i when no further procedures planned  - Beta blocker limited by HR      Josiah Ferguson MD  Cardiology Fellow     All Cardiology service information can be found 24/7 on [email or web address], password: [email or web address]

## 2024-04-02 NOTE — PROGRESS NOTE ADULT - SUBJECTIVE AND OBJECTIVE BOX
[facility] Division of Hospital Medicine  Shira Rowe MD  AVailable on TEAMS 8a-8p    Patient is a 48y old  Male who presents with a chief complaint of Chest pain, coffee-ground emesis       SUBJECTIVE / OVERNIGHT EVENTS: No acute events.  ADDITIONAL REVIEW OF SYSTEMS: otherwise negative    MEDICATIONS  (STANDING):  chlorhexidine 2% Cloths 1 Application(s) Topical daily  pantoprazole    Tablet 40 milliGRAM(s) Oral two times a day    MEDICATIONS  (PRN):  acetaminophen     Tablet .. 650 milliGRAM(s) Oral every 6 hours PRN Temp greater or equal to 38C (100.4F), Mild Pain (1 - 3)  lidocaine   4% Patch 1 Patch Transdermal daily PRN flank pain  ondansetron Injectable 4 milliGRAM(s) IV Push every 8 hours PRN Nausea and/or Vomiting  oxyCODONE    IR 5 milliGRAM(s) Oral every 6 hours PRN Moderate Pain (4 - 6)      PHYSICAL EXAM:  Vital Signs Last 24 Hrs  T(C): 36.9 (02 Apr 2024 11:14), Max: 36.9 (02 Apr 2024 11:14)  T(F): 98.5 (02 Apr 2024 11:14), Max: 98.5 (02 Apr 2024 11:14)  HR: 95 (02 Apr 2024 11:14) (61 - 95)  BP: 124/77 (02 Apr 2024 11:14) (122/71 - 142/89)  BP(mean): --  RR: 18 (02 Apr 2024 11:14) (18 - 18)  SpO2: 98% (02 Apr 2024 11:14) (97% - 98%)    Parameters below as of 02 Apr 2024 11:14  Patient On (Oxygen Delivery Method): room air      CONSTITUTIONAL: NAD, well-developed, well-groomed  EYES: PERRLA; conjunctiva and sclera clear  ENMT: Moist oral mucosa, no pharyngeal injection or exudates  NECK: Supple, no palpable masses; no thyromegaly  RESPIRATORY: Normal respiratory effort; lungs are clear to auscultation bilaterally  CARDIOVASCULAR: Regular rate and rhythm, normal S1 and S2, no murmur/rub/gallop; No lower extremity edema  ABDOMEN: Nontender to palpation, normoactive bowel sounds, no rebound/guarding; No hepatosplenomegaly  MUSCULOSKELETAL:  No clubbing or cyanosis of digits; no joint swelling or tenderness to palpation  PSYCH: A+O to person, place, and time; affect appropriate  NEUROLOGY: CN 2-12 are intact and symmetric; no gross sensory deficits   SKIN: No rashes; no palpable lesions    LABS: reviewed                              12.7   4.54  )-----------( 390      ( 02 Apr 2024 06:54 )             39.2                                     CAPILLARY BLOOD GLUCOSE

## 2024-04-03 LAB
ANION GAP SERPL CALC-SCNC: 11 MMOL/L — SIGNIFICANT CHANGE UP (ref 5–17)
BUN SERPL-MCNC: 27 MG/DL — HIGH (ref 7–23)
CALCIUM SERPL-MCNC: 9.3 MG/DL — SIGNIFICANT CHANGE UP (ref 8.4–10.5)
CHLORIDE SERPL-SCNC: 107 MMOL/L — SIGNIFICANT CHANGE UP (ref 96–108)
CO2 SERPL-SCNC: 22 MMOL/L — SIGNIFICANT CHANGE UP (ref 22–31)
CREAT SERPL-MCNC: 0.78 MG/DL — SIGNIFICANT CHANGE UP (ref 0.5–1.3)
EGFR: 110 ML/MIN/1.73M2 — SIGNIFICANT CHANGE UP
GLUCOSE SERPL-MCNC: 102 MG/DL — HIGH (ref 70–99)
HCT VFR BLD CALC: 37.1 % — LOW (ref 39–50)
HGB BLD-MCNC: 12.1 G/DL — LOW (ref 13–17)
MCHC RBC-ENTMCNC: 27 PG — SIGNIFICANT CHANGE UP (ref 27–34)
MCHC RBC-ENTMCNC: 32.6 GM/DL — SIGNIFICANT CHANGE UP (ref 32–36)
MCV RBC AUTO: 82.8 FL — SIGNIFICANT CHANGE UP (ref 80–100)
NRBC # BLD: 0 /100 WBCS — SIGNIFICANT CHANGE UP (ref 0–0)
PLATELET # BLD AUTO: 356 K/UL — SIGNIFICANT CHANGE UP (ref 150–400)
POTASSIUM SERPL-MCNC: 3.9 MMOL/L — SIGNIFICANT CHANGE UP (ref 3.5–5.3)
POTASSIUM SERPL-SCNC: 3.9 MMOL/L — SIGNIFICANT CHANGE UP (ref 3.5–5.3)
RBC # BLD: 4.48 M/UL — SIGNIFICANT CHANGE UP (ref 4.2–5.8)
RBC # FLD: 14.2 % — SIGNIFICANT CHANGE UP (ref 10.3–14.5)
SODIUM SERPL-SCNC: 140 MMOL/L — SIGNIFICANT CHANGE UP (ref 135–145)
WBC # BLD: 4.82 K/UL — SIGNIFICANT CHANGE UP (ref 3.8–10.5)
WBC # FLD AUTO: 4.82 K/UL — SIGNIFICANT CHANGE UP (ref 3.8–10.5)

## 2024-04-03 PROCEDURE — 75561 CARDIAC MRI FOR MORPH W/DYE: CPT | Mod: 26

## 2024-04-03 PROCEDURE — 99233 SBSQ HOSP IP/OBS HIGH 50: CPT | Mod: GC

## 2024-04-03 PROCEDURE — 99232 SBSQ HOSP IP/OBS MODERATE 35: CPT

## 2024-04-03 RX ORDER — METOPROLOL TARTRATE 50 MG
25 TABLET ORAL DAILY
Refills: 0 | Status: DISCONTINUED | OUTPATIENT
Start: 2024-04-03 | End: 2024-04-04

## 2024-04-03 RX ORDER — LANOLIN ALCOHOL/MO/W.PET/CERES
3 CREAM (GRAM) TOPICAL AT BEDTIME
Refills: 0 | Status: DISCONTINUED | OUTPATIENT
Start: 2024-04-03 | End: 2024-04-04

## 2024-04-03 RX ADMIN — OXYCODONE HYDROCHLORIDE 5 MILLIGRAM(S): 5 TABLET ORAL at 01:05

## 2024-04-03 RX ADMIN — CYCLOBENZAPRINE HYDROCHLORIDE 5 MILLIGRAM(S): 10 TABLET, FILM COATED ORAL at 21:42

## 2024-04-03 RX ADMIN — PANTOPRAZOLE SODIUM 40 MILLIGRAM(S): 20 TABLET, DELAYED RELEASE ORAL at 05:12

## 2024-04-03 RX ADMIN — SPIRONOLACTONE 25 MILLIGRAM(S): 25 TABLET, FILM COATED ORAL at 05:12

## 2024-04-03 RX ADMIN — OXYCODONE HYDROCHLORIDE 5 MILLIGRAM(S): 5 TABLET ORAL at 20:36

## 2024-04-03 RX ADMIN — CHLORHEXIDINE GLUCONATE 1 APPLICATION(S): 213 SOLUTION TOPICAL at 13:10

## 2024-04-03 RX ADMIN — CYCLOBENZAPRINE HYDROCHLORIDE 5 MILLIGRAM(S): 10 TABLET, FILM COATED ORAL at 05:13

## 2024-04-03 RX ADMIN — LOSARTAN POTASSIUM 25 MILLIGRAM(S): 100 TABLET, FILM COATED ORAL at 05:12

## 2024-04-03 RX ADMIN — OXYCODONE HYDROCHLORIDE 5 MILLIGRAM(S): 5 TABLET ORAL at 12:59

## 2024-04-03 RX ADMIN — CYCLOBENZAPRINE HYDROCHLORIDE 5 MILLIGRAM(S): 10 TABLET, FILM COATED ORAL at 13:35

## 2024-04-03 RX ADMIN — Medication 3 MILLIGRAM(S): at 22:02

## 2024-04-03 RX ADMIN — OXYCODONE HYDROCHLORIDE 5 MILLIGRAM(S): 5 TABLET ORAL at 13:30

## 2024-04-03 RX ADMIN — OXYCODONE HYDROCHLORIDE 5 MILLIGRAM(S): 5 TABLET ORAL at 06:25

## 2024-04-03 RX ADMIN — OXYCODONE HYDROCHLORIDE 5 MILLIGRAM(S): 5 TABLET ORAL at 00:03

## 2024-04-03 RX ADMIN — OXYCODONE HYDROCHLORIDE 5 MILLIGRAM(S): 5 TABLET ORAL at 21:06

## 2024-04-03 RX ADMIN — PANTOPRAZOLE SODIUM 40 MILLIGRAM(S): 20 TABLET, DELAYED RELEASE ORAL at 17:16

## 2024-04-03 NOTE — PROGRESS NOTE ADULT - PROBLEM SELECTOR PROBLEM 5
Multiple pulmonary nodules

## 2024-04-03 NOTE — PRE-ANESTHESIA EVALUATION ADULT - WEIGHT IN KG
79.4
79.4

## 2024-04-03 NOTE — PROGRESS NOTE ADULT - PROBLEM SELECTOR PLAN 2
- Troponin negative x2, check 3rd troponin per cardiology recs  - ECG NSR w/ LBBB  - Unclear if angina vs pain from frequent vomiting, per cardio, hold off on further antiplatelets at this time given patient with coffee-ground emesis, patient s/p ASA 324mg in ED  - Coronary calcifications seen on CTA chest, cardiology considering CT coronaries, f/u official decision  - SL NTG not helpful for chest pain  - Heart score of 5 - (2pts for highly suspicious chest pain, 1 pt ECG w/ LBBB, 1 pt age >45, 1 pt for 1-2 risk factors [active smoking, MI in mother])  - Urine drug screen negative  - F/u A1c, TSH, lipid panel  - F/u TTE, check CT coronary
- Troponin negative x 3  - ECG NSR w/ LBBB  - s/p ASA 324mg in ED ; no further antiplatelets given GI bleed  - Urine drug screen negative  - Hb A1c 5.1, TSH wnl,  lipid panel unremarkable  - Heart score of 5 ;  - TTE EF 40 %. Abnormal septal and apical walls  - SL NTG not helpful for chest pain  - trial Oxycodone IR 5mg po q 6 PRN  - f/u cMRI per cards. Pt requesting with anesthesia
- Troponin negative x 3  - ECG NSR w/ LBBB  - s/p ASA 324mg in ED ; no further antiplatelets given GI bleed  - Urine drug screen negative  - Hb A1c 5.1, TSH wnl,  lipid panel unremarkable  - Heart score of 5 ;  - f/t TTE, cMRI per cards  - SL NTG not helpful for chest pain  - trial Oxycodone IR 5mg po q 6 prn
- Troponin negative x 3  - ECG NSR w/ LBBB  - s/p ASA 324mg in ED ; no further antiplatelets given GI bleed  - Urine drug screen negative  - Hb A1c 5.1, TSH wnl,  lipid panel unremarkable  - Heart score of 5 ;  - TTE EF 40 %. Abnormal septal and apical walls  - SL NTG not helpful for chest pain  - trial Oxycodone IR 5mg po q 6 PRN  - f/u cMRI per cards. Pt requesting with anesthesia
- Troponin negative x 3  - ECG NSR w/ LBBB  - s/p ASA 324mg in ED ; no further antiplatelets given GI bleed  - Urine drug screen negative  - Hb A1c 5.1, TSH wnl,  lipid panel unremarkable  - Heart score of 5 ;  TTE, CT coronary as above  -  SL NTG not helpful for chest pain  - will trial Oxycodone IR 5mg po q 6 prn

## 2024-04-03 NOTE — PROGRESS NOTE ADULT - PROBLEM SELECTOR PLAN 3
- Hb 11.5, no recent baseline to compare
- Hb 11.5, no recent baseline to compare  - F/u iron, TIBC, ferritin, transferrin, ferritin, and B12
- Hb 11.5, no recent baseline to compare  - iron studies reviewed
- Hb 11.5, no recent baseline to compare

## 2024-04-03 NOTE — PROGRESS NOTE ADULT - PROBLEM SELECTOR PLAN 6
DVT PPx: SCDs  Code Status: full code
DVT PPx: SCDs  Code Status: full code
DVT PPx: SCDs  Code Status: CPR
DVT PPx: SCDs  Code Status: full code

## 2024-04-03 NOTE — PROGRESS NOTE ADULT - ASSESSMENT
49 yo man with hx of pulmonary sarcoidosis, active tobacco use, optic neuritis, and poor medical contact who presents to the ED with chest pain in s/o emesis. His trop is negative, EKG w/ LBB unclear if new. Symtpoms are not c/w ACS. Coronary CTA with non-obstructive disease, not likely to be the cause of his cardiomyopathy. Must r/o cardaic sarcoid.     Recommendations  - Cardiac MRI noting LGE in non-ischemic pattern. Will arrange eval by heart failure for cardiac PET for possible dx/treatment of cardiac sarcoid   - High intensity statin  - ASA 81 mg daily  - GDMT: Start losartan 25 mg, aldactone 25, start toprol XL 25  - SGLT2i when no further procedures planned      Josiah Ferguson MD  Cardiology Fellow     All Cardiology service information can be found 24/7 on [email or web address], password: [email or web address]

## 2024-04-03 NOTE — PROGRESS NOTE ADULT - SUBJECTIVE AND OBJECTIVE BOX
Patient seen and examined at bedside.    Overnight Events:     REVIEW OF SYSTEMS:  CONSTITUTIONAL: No weakness, fevers or chills  EYES/ENT: No visual changes;  No dysphagia  NECK: No pain or stiffness  RESPIRATORY: No cough, wheezing, hemoptysis; No shortness of breath  CARDIOVASCULAR: No chest pain or palpitations; No lower extremity edema  GASTROINTESTINAL: No abdominal or epigastric pain. No nausea, vomiting, or hematemesis; No diarrhea or constipation. No melena or hematochezia.  BACK: No back pain  GENITOURINARY: No dysuria, frequency or hematuria  NEUROLOGICAL: No numbness or weakness  SKIN: No itching, burning, rashes, or lesions   All other review of systems is negative unless indicated above.            Current Meds:  acetaminophen     Tablet .. 650 milliGRAM(s) Oral every 6 hours PRN  chlorhexidine 2% Cloths 1 Application(s) Topical daily  cyclobenzaprine 5 milliGRAM(s) Oral three times a day  lidocaine   4% Patch 1 Patch Transdermal daily PRN  losartan 25 milliGRAM(s) Oral daily  ondansetron Injectable 4 milliGRAM(s) IV Push every 8 hours PRN  oxyCODONE    IR 5 milliGRAM(s) Oral every 6 hours PRN  pantoprazole    Tablet 40 milliGRAM(s) Oral two times a day  spironolactone 25 milliGRAM(s) Oral daily      Vitals:  T(F): 98.3 (04-03), Max: 98.3 (04-03)  HR: 77 (04-03) (65 - 79)  BP: 113/69 (04-03) (99/63 - 131/82)  RR: 18 (04-03)  SpO2: 97% (04-03)  I&O's Summary    02 Apr 2024 07:01  -  03 Apr 2024 07:00  --------------------------------------------------------  IN: 1000 mL / OUT: 850 mL / NET: 150 mL    03 Apr 2024 07:01  -  03 Apr 2024 16:48  --------------------------------------------------------  IN: 240 mL / OUT: 300 mL / NET: -60 mL        Physical Exam:  Appearance: No acute distress; well appearing  Eyes:  EOMI  HEENT: Normal oral mucosa  Cardiovascular: RRR, S1, S2, no murmurs, rubs, or gallops; no edema; no JVD  Respiratory: Clear to auscultation bilaterally  Gastrointestinal: soft, non-tender, non-distended  Musculoskeletal: No clubbing;  Neurologic: Non-focal  Psychiatry: AAOx3, mood & affect appropriate                          12.1   4.82  )-----------( 356      ( 03 Apr 2024 05:59 )             37.1     04-03    140  |  107  |  27<H>  ----------------------------<  102<H>  3.9   |  22  |  0.78    Ca    9.3      03 Apr 2024 05:59                    New ECG(s): Personally reviewed    Echo:    Stress Testing:     Cath:    New Imaging:    Interpretation of Telemetry:   Patient seen and examined at bedside.    Overnight Events:   NAEON   No acute complaints this AM  Tele - no events     REVIEW OF SYSTEMS:   All other review of systems is negative unless indicated above.            Current Meds:  acetaminophen     Tablet .. 650 milliGRAM(s) Oral every 6 hours PRN  chlorhexidine 2% Cloths 1 Application(s) Topical daily  cyclobenzaprine 5 milliGRAM(s) Oral three times a day  lidocaine   4% Patch 1 Patch Transdermal daily PRN  losartan 25 milliGRAM(s) Oral daily  ondansetron Injectable 4 milliGRAM(s) IV Push every 8 hours PRN  oxyCODONE    IR 5 milliGRAM(s) Oral every 6 hours PRN  pantoprazole    Tablet 40 milliGRAM(s) Oral two times a day  spironolactone 25 milliGRAM(s) Oral daily      Vitals:  T(F): 98.3 (04-03), Max: 98.3 (04-03)  HR: 77 (04-03) (65 - 79)  BP: 113/69 (04-03) (99/63 - 131/82)  RR: 18 (04-03)  SpO2: 97% (04-03)  I&O's Summary    02 Apr 2024 07:01  -  03 Apr 2024 07:00  --------------------------------------------------------  IN: 1000 mL / OUT: 850 mL / NET: 150 mL    03 Apr 2024 07:01  -  03 Apr 2024 16:48  --------------------------------------------------------  IN: 240 mL / OUT: 300 mL / NET: -60 mL        Physical Exam:  Appearance: No acute distress; well appearing  Eyes:  EOMI  HEENT: Normal oral mucosa  Cardiovascular: RRR, S1, S2, no murmurs, rubs, or gallops; no edema; no JVD  Respiratory: Clear to auscultation bilaterally  Gastrointestinal: soft, non-tender, non-distended  Musculoskeletal: No clubbing;  Neurologic: Non-focal  Psychiatry: AAOx3, mood & affect appropriate                          12.1   4.82  )-----------( 356      ( 03 Apr 2024 05:59 )             37.1     04-03    140  |  107  |  27<H>  ----------------------------<  102<H>  3.9   |  22  |  0.78    Ca    9.3      03 Apr 2024 05:59        cMRI  IMPRESSION:    LEFT VENTRICLE: Normal size based on LVEDVI. Borderline mild to moderate   systolic function depression, 40% EF. Regional wall motion abnormality of   the septum and inferior wall. Dyssynchrony noted between the right and   left ventricle. Correlate for arrhythmia. No myocardial hypertrophy or   clear evidence of edema. Nonischemic pattern of delayed enhancement at   the mid ventricle inferior and inferoseptal walls as described above.   Correlate for nonischemic process such as sarcoidosis or myocarditis.    RIGHT VENTRICLE: Normal size based on RVEDVI. Borderline normal systolic   function, 47% EF. Dyssynchrony noted between the right and left   ventricle. No delayed enhancement.

## 2024-04-03 NOTE — PROGRESS NOTE ADULT - PROBLEM SELECTOR PLAN 4
- Not on any meds  - Was diagnosed in 2021 in [place] by a pulmonologist  - Has taken prednisone in the past for a flare    states current presentation not typical of prior sarcoid - typified by dyspnea which he is not having
- Not on any meds  - Was diagnosed in 2021 in [place] by a pulmonologist  - Has taken prednisone in the past for a flare    states current presentation not typical of prior sarcoid - typified by dyspnea which he is not having
- Not on any meds  - Was diagnosed in 2021 in [place] by a pulmonologist  - Has taken prednisone in the past for a flare    states current presentation not typical of prior sarcoid - typified by dyspnea which he is not having
- Not on any meds  - Was diagnosed in 2021 in [place] by a pulmonologist  - Has taken prednisone in the past for a flare    states current presentation not typical of prior sarcoid - typified by dyspnea which he is not having
- Not on any meds  - Was diagnosed in 2021 in [place] by a pulmonologist  - Has taken prednisone in the past for a flare    states current presentation not typical of prior sarcoid - typified by dyspnea which he is not having
- Not on any meds  - Was diagnosed in 2021 in [place] by a pulmonologist  - Has taken prednisone in the past for a flare    states current presentation not typical of prior sarcoid - typified by dyspnea which he is not having
- Not on any meds  - Was diagnosed in 2021 in [place] by a pulmonologist  - Has taken prednisone in the past for a flare    states current presentation not typical of prior sarcoid - typified by dyspnea which he is not having

## 2024-04-03 NOTE — PROGRESS NOTE ADULT - PROBLEM SELECTOR PLAN 5
-  CTA chest showing lobulated 1.4 x 0.8 cm density along superior aspect of the right major fissure which appears to be on both sides of the fissure, 5 mm RLL nodule, and linear peripheral density of RLL w/ small nodular component measuring 8 mm  - No prior exams available for comparison, will need outpatient f/u w/ pulmonary for surveillance CTs    pt concerned may be related to sarcoid  f/u w rheum vs pulm - pt will need to establish care

## 2024-04-03 NOTE — PROGRESS NOTE ADULT - SUBJECTIVE AND OBJECTIVE BOX
[facility] Division of Hospital Medicine  Shira Rowe MD  AVailable on TEAMS 8a-8p    Patient is a 48y old  Male who presents with a chief complaint of Chest pain, coffee-ground emesis       SUBJECTIVE / OVERNIGHT EVENTS: No acute events.  ADDITIONAL REVIEW OF SYSTEMS: otherwise negative    MEDICATIONS  (STANDING):  chlorhexidine 2% Cloths 1 Application(s) Topical daily  pantoprazole    Tablet 40 milliGRAM(s) Oral two times a day    MEDICATIONS  (PRN):  acetaminophen     Tablet .. 650 milliGRAM(s) Oral every 6 hours PRN Temp greater or equal to 38C (100.4F), Mild Pain (1 - 3)  lidocaine   4% Patch 1 Patch Transdermal daily PRN flank pain  ondansetron Injectable 4 milliGRAM(s) IV Push every 8 hours PRN Nausea and/or Vomiting  oxyCODONE    IR 5 milliGRAM(s) Oral every 6 hours PRN Moderate Pain (4 - 6)      PHYSICAL EXAM:  Vital Signs Last 24 Hrs  T(C): 36.7 (03 Apr 2024 13:10), Max: 36.8 (02 Apr 2024 16:20)  T(F): 98.1 (03 Apr 2024 13:10), Max: 98.2 (02 Apr 2024 16:20)  HR: 72 (03 Apr 2024 13:10) (65 - 81)  BP: 112/70 (03 Apr 2024 13:10) (99/63 - 144/72)  BP(mean): 74 (03 Apr 2024 12:10) (68 - 85)  RR: 18 (03 Apr 2024 13:10) (16 - 22)  SpO2: 98% (03 Apr 2024 13:10) (96% - 100%)    Parameters below as of 03 Apr 2024 13:10  Patient On (Oxygen Delivery Method): room air    air      CONSTITUTIONAL: NAD, well-developed, well-groomed  EYES: PERRLA; conjunctiva and sclera clear  ENMT: Moist oral mucosa, no pharyngeal injection or exudates  NECK: Supple, no palpable masses; no thyromegaly  RESPIRATORY: Normal respiratory effort; lungs are clear to auscultation bilaterally  CARDIOVASCULAR: Regular rate and rhythm, normal S1 and S2, no murmur/rub/gallop; No lower extremity edema  ABDOMEN: Nontender to palpation, normoactive bowel sounds, no rebound/guarding; No hepatosplenomegaly  MUSCULOSKELETAL:  No clubbing or cyanosis of digits; no joint swelling or tenderness to palpation  PSYCH: A+O to person, place, and time; affect appropriate  NEUROLOGY: CN 2-12 are intact and symmetric; no gross sensory deficits   SKIN: No rashes; no palpable lesions    LABS: reviewed                               12.1   4.82  )-----------( 356      ( 03 Apr 2024 05:59 )             37.1       04-03    140  |  107  |  27<H>  ----------------------------<  102<H>  3.9   |  22  |  0.78    Ca    9.3      03 Apr 2024 05:59                Urinalysis Basic - ( 03 Apr 2024 05:59 )    Color: x / Appearance: x / SG: x / pH: x  Gluc: 102 mg/dL / Ketone: x  / Bili: x / Urobili: x   Blood: x / Protein: x / Nitrite: x   Leuk Esterase: x / RBC: x / WBC x   Sq Epi: x / Non Sq Epi: x / Bacteria: x                  CAPILLARY BLOOD GLUCOSE

## 2024-04-03 NOTE — PROGRESS NOTE ADULT - PROBLEM SELECTOR PROBLEM 3
Normocytic anemia

## 2024-04-04 VITALS
DIASTOLIC BLOOD PRESSURE: 87 MMHG | RESPIRATION RATE: 18 BRPM | TEMPERATURE: 99 F | SYSTOLIC BLOOD PRESSURE: 134 MMHG | OXYGEN SATURATION: 97 % | HEART RATE: 76 BPM

## 2024-04-04 PROCEDURE — 87640 STAPH A DNA AMP PROBE: CPT

## 2024-04-04 PROCEDURE — 84295 ASSAY OF SERUM SODIUM: CPT

## 2024-04-04 PROCEDURE — 87641 MR-STAPH DNA AMP PROBE: CPT

## 2024-04-04 PROCEDURE — 82803 BLOOD GASES ANY COMBINATION: CPT

## 2024-04-04 PROCEDURE — 85014 HEMATOCRIT: CPT

## 2024-04-04 PROCEDURE — 83880 ASSAY OF NATRIURETIC PEPTIDE: CPT

## 2024-04-04 PROCEDURE — 86901 BLOOD TYPING SEROLOGIC RH(D): CPT

## 2024-04-04 PROCEDURE — 99285 EMERGENCY DEPT VISIT HI MDM: CPT | Mod: 25

## 2024-04-04 PROCEDURE — 82550 ASSAY OF CK (CPK): CPT

## 2024-04-04 PROCEDURE — 83735 ASSAY OF MAGNESIUM: CPT

## 2024-04-04 PROCEDURE — 99233 SBSQ HOSP IP/OBS HIGH 50: CPT | Mod: GC

## 2024-04-04 PROCEDURE — 75574 CT ANGIO HRT W/3D IMAGE: CPT | Mod: MC

## 2024-04-04 PROCEDURE — 75561 CARDIAC MRI FOR MORPH W/DYE: CPT

## 2024-04-04 PROCEDURE — 83540 ASSAY OF IRON: CPT

## 2024-04-04 PROCEDURE — 85018 HEMOGLOBIN: CPT

## 2024-04-04 PROCEDURE — 84132 ASSAY OF SERUM POTASSIUM: CPT

## 2024-04-04 PROCEDURE — 82553 CREATINE MB FRACTION: CPT

## 2024-04-04 PROCEDURE — 85027 COMPLETE CBC AUTOMATED: CPT

## 2024-04-04 PROCEDURE — 85025 COMPLETE CBC W/AUTO DIFF WBC: CPT

## 2024-04-04 PROCEDURE — 80061 LIPID PANEL: CPT

## 2024-04-04 PROCEDURE — 83605 ASSAY OF LACTIC ACID: CPT

## 2024-04-04 PROCEDURE — 82728 ASSAY OF FERRITIN: CPT

## 2024-04-04 PROCEDURE — 82746 ASSAY OF FOLIC ACID SERUM: CPT

## 2024-04-04 PROCEDURE — 96374 THER/PROPH/DIAG INJ IV PUSH: CPT

## 2024-04-04 PROCEDURE — 85730 THROMBOPLASTIN TIME PARTIAL: CPT

## 2024-04-04 PROCEDURE — 80048 BASIC METABOLIC PNL TOTAL CA: CPT

## 2024-04-04 PROCEDURE — 82947 ASSAY GLUCOSE BLOOD QUANT: CPT

## 2024-04-04 PROCEDURE — 83036 HEMOGLOBIN GLYCOSYLATED A1C: CPT

## 2024-04-04 PROCEDURE — 71045 X-RAY EXAM CHEST 1 VIEW: CPT

## 2024-04-04 PROCEDURE — 82330 ASSAY OF CALCIUM: CPT

## 2024-04-04 PROCEDURE — 71275 CT ANGIOGRAPHY CHEST: CPT | Mod: MC

## 2024-04-04 PROCEDURE — 99239 HOSP IP/OBS DSCHRG MGMT >30: CPT

## 2024-04-04 PROCEDURE — 80307 DRUG TEST PRSMV CHEM ANLYZR: CPT

## 2024-04-04 PROCEDURE — 96375 TX/PRO/DX INJ NEW DRUG ADDON: CPT

## 2024-04-04 PROCEDURE — 82607 VITAMIN B-12: CPT

## 2024-04-04 PROCEDURE — C9399: CPT

## 2024-04-04 PROCEDURE — 85610 PROTHROMBIN TIME: CPT

## 2024-04-04 PROCEDURE — 81001 URINALYSIS AUTO W/SCOPE: CPT

## 2024-04-04 PROCEDURE — 82435 ASSAY OF BLOOD CHLORIDE: CPT

## 2024-04-04 PROCEDURE — 86850 RBC ANTIBODY SCREEN: CPT

## 2024-04-04 PROCEDURE — 84100 ASSAY OF PHOSPHORUS: CPT

## 2024-04-04 PROCEDURE — 83550 IRON BINDING TEST: CPT

## 2024-04-04 PROCEDURE — 80053 COMPREHEN METABOLIC PANEL: CPT

## 2024-04-04 PROCEDURE — C8929: CPT

## 2024-04-04 PROCEDURE — 84443 ASSAY THYROID STIM HORMONE: CPT

## 2024-04-04 PROCEDURE — 74174 CTA ABD&PLVS W/CONTRAST: CPT | Mod: MC

## 2024-04-04 PROCEDURE — 36415 COLL VENOUS BLD VENIPUNCTURE: CPT

## 2024-04-04 PROCEDURE — 87086 URINE CULTURE/COLONY COUNT: CPT

## 2024-04-04 PROCEDURE — A9585: CPT

## 2024-04-04 PROCEDURE — 86900 BLOOD TYPING SEROLOGIC ABO: CPT

## 2024-04-04 PROCEDURE — 84466 ASSAY OF TRANSFERRIN: CPT

## 2024-04-04 PROCEDURE — 84484 ASSAY OF TROPONIN QUANT: CPT

## 2024-04-04 RX ORDER — ACETAMINOPHEN 500 MG
2 TABLET ORAL
Qty: 0 | Refills: 0 | DISCHARGE
Start: 2024-04-04

## 2024-04-04 RX ORDER — METOPROLOL TARTRATE 50 MG
1 TABLET ORAL
Qty: 30 | Refills: 0
Start: 2024-04-04 | End: 2024-05-03

## 2024-04-04 RX ORDER — ACETAMINOPHEN 500 MG
1000 TABLET ORAL ONCE
Refills: 0 | Status: COMPLETED | OUTPATIENT
Start: 2024-04-04 | End: 2024-04-04

## 2024-04-04 RX ORDER — FAMOTIDINE 10 MG/ML
1 INJECTION INTRAVENOUS
Qty: 60 | Refills: 0
Start: 2024-04-04 | End: 2024-05-03

## 2024-04-04 RX ORDER — SPIRONOLACTONE 25 MG/1
1 TABLET, FILM COATED ORAL
Qty: 30 | Refills: 0
Start: 2024-04-04 | End: 2024-05-03

## 2024-04-04 RX ORDER — LOSARTAN POTASSIUM 100 MG/1
1 TABLET, FILM COATED ORAL
Qty: 30 | Refills: 0
Start: 2024-04-04 | End: 2024-05-03

## 2024-04-04 RX ORDER — OXYCODONE HYDROCHLORIDE 5 MG/1
1 TABLET ORAL
Qty: 20 | Refills: 0
Start: 2024-04-04 | End: 2024-04-08

## 2024-04-04 RX ORDER — CYCLOBENZAPRINE HYDROCHLORIDE 10 MG/1
1 TABLET, FILM COATED ORAL
Qty: 21 | Refills: 0
Start: 2024-04-04 | End: 2024-04-10

## 2024-04-04 RX ORDER — OXYCODONE HYDROCHLORIDE 5 MG/1
2.5 TABLET ORAL ONCE
Refills: 0 | Status: DISCONTINUED | OUTPATIENT
Start: 2024-04-04 | End: 2024-04-04

## 2024-04-04 RX ADMIN — OXYCODONE HYDROCHLORIDE 5 MILLIGRAM(S): 5 TABLET ORAL at 03:10

## 2024-04-04 RX ADMIN — CHLORHEXIDINE GLUCONATE 1 APPLICATION(S): 213 SOLUTION TOPICAL at 11:03

## 2024-04-04 RX ADMIN — CYCLOBENZAPRINE HYDROCHLORIDE 5 MILLIGRAM(S): 10 TABLET, FILM COATED ORAL at 05:33

## 2024-04-04 RX ADMIN — OXYCODONE HYDROCHLORIDE 5 MILLIGRAM(S): 5 TABLET ORAL at 09:04

## 2024-04-04 RX ADMIN — Medication 400 MILLIGRAM(S): at 05:35

## 2024-04-04 RX ADMIN — LOSARTAN POTASSIUM 25 MILLIGRAM(S): 100 TABLET, FILM COATED ORAL at 05:33

## 2024-04-04 RX ADMIN — PANTOPRAZOLE SODIUM 40 MILLIGRAM(S): 20 TABLET, DELAYED RELEASE ORAL at 05:33

## 2024-04-04 RX ADMIN — Medication 25 MILLIGRAM(S): at 05:33

## 2024-04-04 RX ADMIN — OXYCODONE HYDROCHLORIDE 5 MILLIGRAM(S): 5 TABLET ORAL at 02:40

## 2024-04-04 RX ADMIN — LIDOCAINE 1 PATCH: 4 CREAM TOPICAL at 08:16

## 2024-04-04 RX ADMIN — SPIRONOLACTONE 25 MILLIGRAM(S): 25 TABLET, FILM COATED ORAL at 05:33

## 2024-04-04 RX ADMIN — Medication 1000 MILLIGRAM(S): at 08:11

## 2024-04-04 RX ADMIN — CYCLOBENZAPRINE HYDROCHLORIDE 5 MILLIGRAM(S): 10 TABLET, FILM COATED ORAL at 13:02

## 2024-04-04 RX ADMIN — LIDOCAINE 1 PATCH: 4 CREAM TOPICAL at 00:23

## 2024-04-04 RX ADMIN — OXYCODONE HYDROCHLORIDE 2.5 MILLIGRAM(S): 5 TABLET ORAL at 00:53

## 2024-04-04 RX ADMIN — OXYCODONE HYDROCHLORIDE 5 MILLIGRAM(S): 5 TABLET ORAL at 11:04

## 2024-04-04 RX ADMIN — OXYCODONE HYDROCHLORIDE 2.5 MILLIGRAM(S): 5 TABLET ORAL at 00:23

## 2024-04-04 NOTE — DISCHARGE NOTE NURSING/CASE MANAGEMENT/SOCIAL WORK - NSDCPEFALRISK_GEN_ALL_CORE
For information on Fall & Injury Prevention, visit: [email or web address] OR  [email or web address] OR  https://www.cdc.gov/steadi/patient.html

## 2024-04-04 NOTE — PROVIDER CONTACT NOTE (OTHER) - REASON
pt requested melatonin
pt still complaint of pain,
RN found pt to be eating snacks he brought from home when he is supposed to be NPO
pt complaint of pain and request for breakthrough pain

## 2024-04-04 NOTE — PROVIDER CONTACT NOTE (OTHER) - ASSESSMENT
pt AOx4, pt requested melatonin to help aid sleep.
Pt is A&OX4, VSS, pt educated on need to be NPO for possible Endoscopy
pt AOx4, still complaint of pain after taking oxy med
pt complaint of pain and request for breakthrough pain med. pt stated that the pain is getting worse than yesterday. pain rated at 7/10.

## 2024-04-04 NOTE — DISCHARGE NOTE PROVIDER - NSFOLLOWUPCLINICS_GEN_ALL_ED_FT
Cardiology at [facility]  Cardiology  [address] [place] [address]  Phone: [phone]  Fax:     Gastroenterology at [facility]  Gastroenterology  [address] [place] [address]  Phone: [phone]  Fax:     [facility]  Gastroenterology  [address] [address]  Phone: [phone]  Fax:      Cardiology at [facility]  Cardiology  [address] [place] [address]  Phone: [phone]  Fax:     Gastroenterology at [facility]  Gastroenterology  [address] [place] [address]  Phone: [phone]  Fax:     [facility]  Gastroenterology  [address] [place] [address]  Phone: [phone]  Fax:     [facility] General Internal Medicine  General Internal Medicine  [address] [place] [address]  Phone: [phone]  Fax:   Follow Up Time: 1 week

## 2024-04-04 NOTE — DISCHARGE NOTE PROVIDER - NSFOLLOWUPCLINICSTOKEN_GEN_ALL_ED_FT
[id number]: || ||00\01||False;[id number]: || ||00\01||False;[id number]: || ||00\01||False; 204660: || ||00\01||False;270077: || ||00\01||False;203260: || ||00\01||False;661932:1 week|| ||00\01||False;

## 2024-04-04 NOTE — PROGRESS NOTE ADULT - ATTENDING COMMENTS
48 year old man with atypical chest pain, newly recognized LBBB, pulmonary sarcoid and CCTA with only non-obstructive CAD. Has regional WMA's on echo; thus now obtaining cMRI.    To contact call Cardiology Fellow or Attending as listed on [email or web address] password: [email or web address].
48 year old man with atypical chest pain, newly recognized LBBB, pulmonary sarcoid and CCTA with only non-obstructive CAD. Has regional WMA's on echo. Obtained cMRI demonstrating LGE in non-ischemic pattern thus cardiac sarcoid remains high in the differential. Plan discharge to home today with follow up in Heart Failure Clinic to arrange cardiac PET and eventual treatment in conjunction with Rheumatology if cardiac sarcoid confirmed.    To contact call Cardiology Fellow or Attending as listed on [email or web address] password: [email or web address].
48 year old man with atypical chest pain, newly recognized LBBB, pulmonary sarcoid and CCTA with only non-obstructive CAD. Has regional WMA's on echo. Obtaining cMRI and may subsequently require cardiac PET.    To contact call Cardiology Fellow or Attending as listed on [email or web address] password: [email or web address].
Atypical chest pain, r/o MI, LBBB, sarcoid. CCTA shows non-obstructive CAD. Regional WMA's on echo; now needs cMRI to further assess wall motion, ?infiltration (sarcoid). Getting GI eval as well.
48 year old man with atypical chest pain, newly recognized LBBB, pulmonary sarcoid and CCTA with only non-obstructive CAD. Has regional WMA's on echo. Obtained cMRI demonstrating LGE in non-ischemic pattern thus cardiac sarcoid remains high in the differential. May discharge to home with follow up in Heart Failure Clinic to arrange cardiac PET and eventual treatment in conjunction with Rheumatology if cardiac sarcoid confirmed.    To contact call Cardiology Fellow or Attending as listed on [email or web address] password: [email or web address].

## 2024-04-04 NOTE — PROGRESS NOTE ADULT - REASON FOR ADMISSION
Chest pain, coffee-ground emesis

## 2024-04-04 NOTE — PROGRESS NOTE ADULT - ASSESSMENT
47 yo man with hx of pulmonary sarcoidosis, active tobacco use, optic neuritis, and poor medical contact who presents to the ED with chest pain in s/o emesis. His trop is negative, EKG w/ LBB unclear if new. Symtpoms are not c/w ACS. Coronary CTA with non-obstructive disease, not likely to be the cause of his cardiomyopathy. Must r/o cardaic sarcoid.     Recommendations  - Cardiac MRI noting LGE in non-ischemic pattern. Will arrange eval by heart failure for cardiac PET for possible dx/treatment of cardiac sarcoid - HF team to see as an outpatient   - High intensity statin  - ASA 81 mg daily  - GDMT: losartan 25 mg, aldactone 25, toprol XL 25  - SGLT2i when no further procedures planned      Josiah Ferguson MD  Cardiology Fellow     All Cardiology service information can be found 24/7 on [email or web address], password: [email or web address]

## 2024-04-04 NOTE — PROVIDER CONTACT NOTE (OTHER) - SITUATION
RN found pt to be eating snacks he brought from home when he is supposed to be NPO. Pt was aware he was supposed to be NPO, when RN confronted pt, pt stated "ik but I am so hungry".
pt complaint of pain and request for breakthrough pain med.
pt requested melatonin
pt still complaint of pain

## 2024-04-04 NOTE — PROVIDER CONTACT NOTE (OTHER) - ACTION/TREATMENT ORDERED:
PA aware.
provide rmade aware. oxy 2.5mg ordered
provider made aware. melatonin 3mg ordered
provider made aware. IV tylenol ordered

## 2024-04-04 NOTE — PROVIDER CONTACT NOTE (OTHER) - BACKGROUND
pt admitted for unstable angina pectoris. pt have hx of chronic flank pain
pt admitted for unstable angina pectoris. pt have chronic flak back pain. Recent oxy 5mg which was given at 20:30 was not helping much with the pain.
pt admitted unstable angina & vomiting.
pt admitted for unstable angina pectoris. pt have chronic back flank pain

## 2024-04-04 NOTE — DISCHARGE NOTE PROVIDER - NSDCCPCAREPLAN_GEN_ALL_CORE_FT
PRINCIPAL DISCHARGE DIAGNOSIS  Diagnosis: Unstable angina  Assessment and Plan of Treatment: Cardiac workup was notable for regional Wall Motion Abnormalities on echo. Obtained cMRI demonstrating LGE in non-ischemic pattern thus cardiac sarcoid remains high in the differential.   Please follow up outpatient in Heart Failure Clinic to arrange cardiac PET and eventual treatment in conjunction with Rheumatology if cardiac sarcoid confirmed.           SECONDARY DISCHARGE DIAGNOSES  Diagnosis: Hematemesis  Assessment and Plan of Treatment: GI was consulted - Patient underwent EGD which revealed esophagitis (likely source of upper GI bleeding) and hiatal hernia. Hgb stable. No overt bleeding since admission, this no further inpatient GI intervention. Patient recommended to continue PPI BID and GI f/u for outpatient colonoscopy to evaluate weight loss.   Follow up in Gastroenterology Clinic: [phone] ([facility] at [address] [place] [address]) or [phone] ([facility] at [address] [place] [address]) or [phone] ([facility] at [address] [place] [address])  or [place] Office: [phone] ([address] [place] [address]. [place], [address])         Diagnosis: Multiple pulmonary nodules  Assessment and Plan of Treatment: CTA chest revealed lobulated 1.4 x 0.8 cm density along superior aspect of the right major fissure which appears to be on both sides of the fissure, 5 mm RLL nodule, and linear peripheral density of Right Lower Lobe with small nodular component measuring 8 mm,   No prior exams available for comparison, you will need outpatient followup with pulmonary for surveillance CTs     PRINCIPAL DISCHARGE DIAGNOSIS  Diagnosis: Unstable angina  Assessment and Plan of Treatment: Cardiac workup was notable for regional Wall Motion Abnormalities on echo. Obtained cMRI demonstrating LGE in non-ischemic pattern thus cardiac sarcoid remains high in the differential.   Please follow up outpatient in Heart Failure Clinic to arrange cardiac PET and eventual treatment in conjunction with Rheumatology if cardiac sarcoid confirmed.           SECONDARY DISCHARGE DIAGNOSES  Diagnosis: Hematemesis  Assessment and Plan of Treatment: GI was consulted - Patient underwent EGD which revealed esophagitis (likely source of upper GI bleeding) and hiatal hernia. Hgb stable. No overt bleeding since admission, this no further inpatient GI intervention. Patient recommended to continue PPI BID and GI f/u for outpatient colonoscopy to evaluate weight loss.   Follow up in Gastroenterology Clinic: [phone] ([facility] at [address] [place] [address]) or [phone] ([facility] at [address] [place] [address]) or [phone] ([facility] at [address] [place] [address])  or [place] Office: [phone] ([address] [place] [address]. [address] [place], [address])         Diagnosis: Multiple pulmonary nodules  Assessment and Plan of Treatment: CTA chest revealed lobulated 1.4 x 0.8 cm density along superior aspect of the right major fissure which appears to be on both sides of the fissure, 5 mm RLL nodule, and linear peripheral density of Right Lower Lobe with small nodular component measuring 8 mm,   No prior exams available for comparison, you will need outpatient followup with pulmonary for surveillance CTs.

## 2024-04-04 NOTE — PROGRESS NOTE ADULT - PROVIDER SPECIALTY LIST ADULT
Cardiology
Hospitalist

## 2024-04-04 NOTE — DISCHARGE NOTE PROVIDER - CONDITIONS AT DISCHARGE
Request from Dr. Rowe to facilitate patient discharge. Medication reconciliation reviewed, revised and resolved with Dr. Rowe, who has medically cleared patient for discharge with follow-up as advised.

## 2024-04-04 NOTE — DISCHARGE NOTE PROVIDER - ATTENDING DISCHARGE PHYSICAL EXAMINATION:
Vital Signs Last 24 Hrs  T(C): 37.1 (04 Apr 2024 11:01), Max: 37.1 (04 Apr 2024 11:01)  T(F): 98.7 (04 Apr 2024 11:01), Max: 98.7 (04 Apr 2024 11:01)  HR: 76 (04 Apr 2024 11:01) (66 - 87)  BP: 134/87 (04 Apr 2024 11:01) (113/69 - 149/76)  BP(mean): --  RR: 18 (04 Apr 2024 11:01) (18 - 18)  SpO2: 97% (04 Apr 2024 11:01) (97% - 99%)    Parameters below as of 04 Apr 2024 11:01  Patient On (Oxygen Delivery Method): room air    GENERAL: Well appearing, in NAD  EYES: EOMI, conjunctiva and sclera clear  ENT: moist mucosa, no pharyngeal erythema  NECK: supple, no JVD  LUNG: Clear to auscultation bilaterally; No rales, rhonchi, wheezing, or rubs.   CVS: Regular rate and rhythm; No murmurs, rubs, or gallops  ABDOMEN: Soft, non tender, nondistended. +Bowel sounds.  EXTREMITIES:  no edema, no cyanosis  NEURO:  Alert & Oriented X3,  No focal deficits  PSYCH: Normal affect, normal mood  MUSCULOSKELETAL: FROM, no joint swelling  Skin: warm and dry, normal color

## 2024-04-04 NOTE — DISCHARGE NOTE PROVIDER - NSDCFUADDAPPT_GEN_ALL_CORE_FT
APPTS ARE READY TO BE MADE: [x] YES    Best Family or Patient Contact (if needed):    Additional Information about above appointments (if needed):    1:   2:   3:     Other comments or requests:    APPTS ARE READY TO BE MADE: [x] YES    Best Family or Patient Contact (if needed):    Additional Information about above appointments (if needed):    1: Follow-up with primary care doctor.  2: Follow-up with cardiology.   3: Follow-up with heart failure clinic.   4. Follow-up with gastroenterology.     Other comments or requests:    APPTS ARE READY TO BE MADE: [x] YES    Best Family or Patient Contact (if needed):    Additional Information about above appointments (if needed):    1: Follow-up with primary care doctor within one week.  2: Follow-up with cardiology.   3: Follow-up with heart failure clinic at [facility].   4. Follow-up with gastroenterology.     Other comments or requests:    APPTS ARE READY TO BE MADE: [x] YES    Best Family or Patient Contact (if needed):    Additional Information about above appointments (if needed):    1: Follow-up with primary care doctor within one week.  2: Follow-up with cardiology.   3: Follow-up with heart failure clinic at [facility].   4. Follow-up with gastroenterology.     Other comments or requests:     Patient was outreached but did not answer. A voicemail was left for the patient to return our call.

## 2024-04-04 NOTE — DISCHARGE NOTE PROVIDER - HOSPITAL COURSE
HPI:  This is a 49 y/o male w/ PMHx sarcoidosis, optic neuritis and active smoking who presents for chest pain and black emesis. His symptoms first started with an episode of emesis yesterdat which was yellow-colored. Today, he started vomiting several times, and at the 5th episode, the color of the vomitus turned from yellow to a dark, black-arleth color. Around that time, he also started to develop chest pain as well. The chest pain was in the middle of his chest, radiated to the left side of his neck, squeezing in quality, and was constant. Nothing relieved or worsened the pain, and he didn't have any associated shortness of breath, dyspnea on exertion, diaphoresis, or palpitations. He doesn't take any medications. Is currently an active smoker, is a 15-pack year smoker and smokes 1/2 pack a day currently. Decided to come to the ED for further evaluation.  Of note, he states he had a lower GI bleed over 2 decades ago and required a colonoscopy, hasn't had one since then. His sarcoidosis was diagnosed 3 years ago in [place] when an MRI of his brain which was being done for optic neuritis captured the top of his lungs. He had a mediastinoscopy w/ biopsy which confirmed the diagnosis. He doesn't take any medications for it, said he has taken prednisone in the past for a flare-up. Currently lives in [facility], says he follows with [facility].    In the ED, he was afebrile and hemodynamically stable, saturating well on RA. CBC w/ normocytic anemia of 11.5, CMP grossly wnl. Troponin 9-> 10, CKMB 4.7->4.3, CPK 2.6%, pro-. UA w/ specific gravity >1.03. Urine drug screen negative. CTA chest/abdomen/pelvis was done, which was negative for dissection, positive for coronary calcifications, and wall thickening of the GE junction and gastric cardia. ECG showing NSR w/ LBBB, no prior comparisons available. Given ASA 324mg, SL NTG, IVP pantoprazole 80mg, IVP Pepcid 20mg, and started on pantoprazole drip in the ED. (27 Mar 2024 23:44)    Hospital Course: Patient was admitted for further medical management of possible UGIB and of chest pain. GI was consulted - Patient underwent EGD which revealed esophagitis (likely source of upper GI bleeding) and hiatal hernia. Hgb stable. No overt bleeding since admission, this no further inpatient GI intervention. Patient recommended to continue PPI BID and GI f/u for outpatient colonoscopy to evaluate weight loss.      Cardiac workup was notable for regional WMA's on echo. Obtained cMRI demonstrating LGE in non-ischemic pattern thus cardiac sarcoid remains high in the differential. Per cardiology - patient may follow up outpatient in Heart Failure Clinic to arrange cardiac PET and eventual treatment in conjunction with Rheumatology if cardiac sarcoid confirmed.     Discharge/Dispo/Med rec discussed with attending  ____. Patient medically cleared for discharge ____ with outpatient follow up       Important Medication Changes and Reason:    Active or Pending Issues Requiring Follow-up:  - Patient recommended to continue PPI BID and GI f/u for outpatient colonoscopy to evaluate weight loss. Follow up in Gastroenterology Clinic: [phone] ([facility] at [address] [place] [address]) or [phone] ([facility] at [address] [place] [address]) or [phone] ([facility] at [address] [place] [address])  or [place] Office: [phone] ([address] [place] [address]. [address] [place], [address])    - CTA chest showing lobulated 1.4 x 0.8 cm density along superior aspect of the right major fissure which appears to be on both sides of the fissure, 5 mm RLL nodule, and linear peripheral density of RLL w/ small nodular component measuring 8 mm, No prior exams available for comparison, will need outpatient f/u w/ pulmonary for surveillance CTs vs rheum    Advanced Directives:   [ ] Full code  [ ] DNR  [ ] Hospice    Discharge Diagnoses:         HPI: This is a 49 y/o male w/ PMHx sarcoidosis, optic neuritis and active smoking who presents for chest pain and black emesis. His symptoms first started with an episode of emesis yesterdat which was yellow-colored. Today, he started vomiting several times, and at the 5th episode, the color of the vomitus turned from yellow to a dark, black-arleth color. Around that time, he also started to develop chest pain as well. The chest pain was in the middle of his chest, radiated to the left side of his neck, squeezing in quality, and was constant. Nothing relieved or worsened the pain, and he didn't have any associated shortness of breath, dyspnea on exertion, diaphoresis, or palpitations. He doesn't take any medications. Is currently an active smoker, is a 15-pack year smoker and smokes 1/2 pack a day currently. Decided to come to the ED for further evaluation.  Of note, he states he had a lower GI bleed over 2 decades ago and required a colonoscopy, hasn't had one since then. His sarcoidosis was diagnosed 3 years ago in [place] when an MRI of his brain which was being done for optic neuritis captured the top of his lungs. He had a mediastinoscopy w/ biopsy which confirmed the diagnosis. He doesn't take any medications for it, said he has taken prednisone in the past for a flare-up. Currently lives in [facility], says he follows with [facility] [place].    In the ED, he was afebrile and hemodynamically stable, saturating well on RA. CBC w/ normocytic anemia of 11.5, CMP grossly wnl. Troponin 9-> 10, CKMB 4.7->4.3, CPK 2.6%, pro-. UA w/ specific gravity >1.03. Urine drug screen negative. CTA chest/abdomen/pelvis was done, which was negative for dissection, positive for coronary calcifications, and wall thickening of the GE junction and gastric cardia. ECG showing NSR w/ LBBB, no prior comparisons available. Given ASA 324mg, SL NTG, IVP pantoprazole 80mg, IVP Pepcid 20mg, and started on pantoprazole drip in the ED. (27 Mar 2024 23:44)    Hospital Course: Patient was admitted for further medical management of possible UGIB and of chest pain. GI was consulted - Patient underwent EGD which revealed esophagitis (likely source of upper GI bleeding) and hiatal hernia. Hgb stable. No overt bleeding since admission, this no further inpatient GI intervention. Patient recommended to continue PPI BID and GI f/u for outpatient colonoscopy to evaluate weight loss.      Cardiac workup was notable for regional WMA's on echo. Obtained cMRI demonstrating LGE in non-ischemic pattern thus cardiac sarcoid remains high in the differential. Per cardiology - patient may follow up outpatient in Heart Failure Clinic to arrange cardiac PET and eventual treatment in conjunction with Rheumatology if cardiac sarcoid confirmed.      Important Medication Changes and Reason:  New medications as per cardiology: Losartan 25mg QD, Toprol XL 25mg QD, Aldactone 25mg QD       Active or Pending Issues Requiring Follow-up:  - Patient recommended to continue PPI BID and GI f/u for outpatient colonoscopy to evaluate weight loss. Follow up in Gastroenterology Clinic: [phone] ([facility] at [address] [place] [address]) or [phone] ([facility] at [address] [place] [address]) or [phone] ([facility] at [address] [place] [address])  or [place] Office: [phone] ([address] [place] [address]. [place], [address])    - CTA chest showing lobulated 1.4 x 0.8 cm density along superior aspect of the right major fissure which appears to be on both sides of the fissure, 5 mm RLL nodule, and linear peripheral density of RLL w/ small nodular component measuring 8 mm, No prior exams available for comparison, will need outpatient f/u w/ pulmonary for surveillance CTs vs rheum    Advanced Directives:   [X] Full code  [ ] DNR  [ ] Hospice    Discharge Diagnoses:  Chest pain  Coffee ground emesis       Request from Dr. Rowe to facilitate patient discharge. Medication reconciliation reviewed, revised and resolved with Dr. Rowe, who has medically cleared patient for discharge with follow-up as advised. HPI: This is a 49 y/o male w/ PMHx sarcoidosis, optic neuritis and active smoking who presents for chest pain and black emesis. His symptoms first started with an episode of emesis one day prior to admission which was yellow-colored. Today, he started vomiting several times, and at the 5th episode, the color of the vomitus turned from yellow to a dark, black-arleth color. Around that time, he also started to develop chest pain as well. The chest pain was in the middle of his chest, radiated to the left side of his neck, squeezing in quality, and was constant. Nothing relieved or worsened the pain, and he didn't have any associated shortness of breath, dyspnea on exertion, diaphoresis, or palpitations. He doesn't take any medications. Is currently an active smoker, is a 15-pack year smoker and smokes 1/2 pack a day currently. Decided to come to the ED for further evaluation.  Of note, he states he had a lower GI bleed over 2 decades ago and required a colonoscopy, hasn't had one since then. His sarcoidosis was diagnosed 3 years ago in [place] when an MRI of his brain which was being done for optic neuritis captured the top of his lungs. He had a mediastinoscopy w/ biopsy which confirmed the diagnosis. He doesn't take any medications for it, said he has taken prednisone in the past for a flare-up. Currently lives in [facility], says he follows with [facility].    In the ED, he was afebrile and hemodynamically stable, saturating well on RA. CBC w/ normocytic anemia of 11.5, CMP grossly wnl. Troponin 9-> 10, CKMB 4.7->4.3, CPK 2.6%, pro-. UA w/ specific gravity >1.03. Urine drug screen negative. CTA chest/abdomen/pelvis was done, which was negative for dissection, positive for coronary calcifications, and wall thickening of the GE junction and gastric cardia. ECG showing NSR w/ LBBB, no prior comparisons available. Given ASA 324mg, SL NTG, IVP pantoprazole 80mg, IVP Pepcid 20mg, and started on pantoprazole drip in the ED. (27 Mar 2024 23:44)    Hospital Course: Patient was admitted for further medical management of possible UGIB and of chest pain. GI was consulted - Patient underwent EGD which revealed esophagitis (likely source of upper GI bleeding) and hiatal hernia. Hgb stable. No overt bleeding since admission, this no further inpatient GI intervention. Patient recommended to continue PPI BID and GI f/u for outpatient colonoscopy to evaluate weight loss.      Cardiac workup was notable for regional WMA's on echo. Obtained cMRI demonstrating LGE in non-ischemic pattern thus cardiac sarcoid remains high in the differential. Per cardiology - patient may follow up outpatient in Heart Failure Clinic to arrange cardiac PET and eventual treatment in conjunction with Rheumatology if cardiac sarcoid confirmed.      Important Medication Changes and Reason:  New medications as per cardiology: Losartan 25mg QD, Toprol XL 25mg QD, Aldactone 25mg QD and famotidine 20mg BID       Active or Pending Issues Requiring Follow-up:  - Patient recommended to continue PPI BID and GI f/u for outpatient colonoscopy to evaluate weight loss. Follow up in Gastroenterology Clinic: [phone] ([facility] at [address] [place] [address]) or [phone] ([facility] at [address] [place] [address]) or [phone] ([facility] at [address] [place] [address])  or [place] Office: [phone] ([address] [place] [address]. [place], [address])    - CTA chest showing lobulated 1.4 x 0.8 cm density along superior aspect of the right major fissure which appears to be on both sides of the fissure, 5 mm RLL nodule, and linear peripheral density of RLL w/ small nodular component measuring 8 mm, No prior exams available for comparison, will need outpatient f/u w/ pulmonary for surveillance CTs vs rheum    Patient will need to follow-up with cardiology and heart failure clinic to arrange cardiac PET and eventual treatment in conjunction with rheumatology if cardiac sarcoid is confirmed.     Advanced Directives:   [X] Full code  [ ] DNR  [ ] Hospice    Discharge Diagnoses:  Chest pain  Coffee ground emesis   Lung nodule       Request from Dr. Rowe to facilitate patient discharge. Medication reconciliation reviewed, revised and resolved with Dr. Rowe, who has medically cleared patient for discharge with follow-up as advised. HPI: This is a 49 y/o male w/ PMHx sarcoidosis, optic neuritis and active smoking who presents for chest pain and black emesis. His symptoms first started with an episode of emesis one day prior to admission which was yellow-colored. Today, he started vomiting several times, and at the 5th episode, the color of the vomitus turned from yellow to a dark, black-arleth color. Around that time, he also started to develop chest pain as well. The chest pain was in the middle of his chest, radiated to the left side of his neck, squeezing in quality, and was constant. Nothing relieved or worsened the pain, and he didn't have any associated shortness of breath, dyspnea on exertion, diaphoresis, or palpitations. He doesn't take any medications. Is currently an active smoker, is a 15-pack year smoker and smokes 1/2 pack a day currently. Decided to come to the ED for further evaluation.  Of note, he states he had a lower GI bleed over 2 decades ago and required a colonoscopy, hasn't had one since then. His sarcoidosis was diagnosed 3 years ago in [place] when an MRI of his brain which was being done for optic neuritis captured the top of his lungs. He had a mediastinoscopy w/ biopsy which confirmed the diagnosis. He doesn't take any medications for it, said he has taken prednisone in the past for a flare-up. Currently lives in [facility], says he follows with [facility].    In the ED, he was afebrile and hemodynamically stable, saturating well on RA. CBC w/ normocytic anemia of 11.5, CMP grossly wnl. Troponin 9-> 10, CKMB 4.7->4.3, CPK 2.6%, pro-. UA w/ specific gravity >1.03. Urine drug screen negative. CTA chest/abdomen/pelvis was done, which was negative for dissection, positive for coronary calcifications, and wall thickening of the GE junction and gastric cardia. ECG showing NSR w/ LBBB, no prior comparisons available. Given ASA 324mg, SL NTG, IVP pantoprazole 80mg, IVP Pepcid 20mg, and started on pantoprazole drip in the ED. (27 Mar 2024 23:44)    Hospital Course: Patient was admitted for further medical management of possible UGIB and of chest pain. GI was consulted - Patient underwent EGD which revealed esophagitis (likely source of upper GI bleeding) and hiatal hernia. Hgb stable. No overt bleeding since admission, this no further inpatient GI intervention. Patient recommended to continue PPI BID and GI f/u for outpatient colonoscopy to evaluate weight loss.      Cardiac workup was notable for regional WMA's on echo. Obtained cMRI demonstrating LGE in non-ischemic pattern thus cardiac sarcoid remains high in the differential. Per cardiology - patient may follow up outpatient in Heart Failure Clinic to arrange cardiac PET and eventual treatment in conjunction with Rheumatology if cardiac sarcoid confirmed.      Important Medication Changes and Reason:  New medications as per cardiology: Losartan 25mg QD, Toprol XL 25mg QD, Aldactone 25mg QD and famotidine 20mg BID       Active or Pending Issues Requiring Follow-up:  - Patient recommended to continue PPI BID and GI f/u for outpatient colonoscopy to evaluate weight loss. Follow up in Gastroenterology Clinic: [phone] ([facility] at [address] [place] [address]) or [phone] ([facility] at [address] [place] [address]) or [phone] ([facility] at [address] [place] [address])  or [place] Office: [phone] ([address] [place] [address]. [place], [address])    - CTA chest showing lobulated 1.4 x 0.8 cm density along superior aspect of the right major fissure which appears to be on both sides of the fissure, 5 mm RLL nodule, and linear peripheral density of RLL w/ small nodular component measuring 8 mm, No prior exams available for comparison, will need outpatient f/u w/ pulmonary for surveillance CTs vs rheum    Patient will need to follow-up with cardiology and heart failure clinic to arrange cardiac PET and eventual treatment in conjunction with rheumatology if cardiac sarcoid is confirmed.     Advanced Directives:   [X] Full code  [ ] DNR  [ ] Hospice    Discharge Diagnoses:  Chest pain, ACS ruled out  Coffee ground emesis   Lung nodule   Esophagitis      Request from Dr. Rowe to facilitate patient discharge. Medication reconciliation reviewed, revised and resolved with Dr. Rowe, who has medically cleared patient for discharge with follow-up as advised.

## 2024-04-04 NOTE — DISCHARGE NOTE NURSING/CASE MANAGEMENT/SOCIAL WORK - NSDCFUADDAPPT_GEN_ALL_CORE_FT
APPTS ARE READY TO BE MADE: [x] YES    Best Family or Patient Contact (if needed):    Additional Information about above appointments (if needed):    1: Follow-up with primary care doctor within one week.  2: Follow-up with cardiology.   3: Follow-up with heart failure clinic at [facility].   4. Follow-up with gastroenterology.     Other comments or requests:

## 2024-04-04 NOTE — DISCHARGE NOTE NURSING/CASE MANAGEMENT/SOCIAL WORK - PATIENT PORTAL LINK FT
You can access the FollowMyHealth Patient Portal offered by [facility] by registering at the following website: [email or web address]. By joining [facility]’s FollowMyHealth portal, you will also be able to view your health information using other applications (apps) compatible with our system.

## 2024-04-04 NOTE — DISCHARGE NOTE PROVIDER - NSDCMRMEDTOKEN_GEN_ALL_CORE_FT
acetaminophen 325 mg oral tablet: 2 tab(s) orally every 6 hours As needed Temp greater or equal to 38C (100.4F), Mild Pain (1 - 3)  cyclobenzaprine 5 mg oral tablet: 1 tab(s) orally 3 times a day  famotidine 20 mg oral tablet: 1 tab(s) orally every 12 hours  losartan 25 mg oral tablet: 1 tab(s) orally once a day  metoprolol succinate 25 mg oral tablet, extended release: 1 tab(s) orally once a day  oxyCODONE 5 mg oral tablet: 1 tab(s) orally every 6 hours as needed for  severe pain MDD: 4  spironolactone 25 mg oral tablet: 1 tab(s) orally once a day

## 2024-04-04 NOTE — PROGRESS NOTE ADULT - SUBJECTIVE AND OBJECTIVE BOX
Patient seen and examined at bedside.    Overnight Events:   NAEON  No acute complaints this AM  tele - NSR    REVIEW OF SYSTEMS:  All other review of systems is negative unless indicated above.            Current Meds:  acetaminophen     Tablet .. 650 milliGRAM(s) Oral every 6 hours PRN  chlorhexidine 2% Cloths 1 Application(s) Topical daily  cyclobenzaprine 5 milliGRAM(s) Oral three times a day  lidocaine   4% Patch 1 Patch Transdermal daily PRN  losartan 25 milliGRAM(s) Oral daily  melatonin 3 milliGRAM(s) Oral at bedtime  metoprolol succinate ER 25 milliGRAM(s) Oral daily  ondansetron Injectable 4 milliGRAM(s) IV Push every 8 hours PRN  oxyCODONE    IR 5 milliGRAM(s) Oral every 6 hours PRN  pantoprazole    Tablet 40 milliGRAM(s) Oral two times a day  spironolactone 25 milliGRAM(s) Oral daily      Vitals:  T(F): 98.7 (04-04), Max: 98.7 (04-04)  HR: 76 (04-04) (66 - 87)  BP: 134/87 (04-04) (113/69 - 149/76)  RR: 18 (04-04)  SpO2: 97% (04-04)  I&O's Summary    03 Apr 2024 07:01  -  04 Apr 2024 07:00  --------------------------------------------------------  IN: 240 mL / OUT: 300 mL / NET: -60 mL    04 Apr 2024 07:01  -  04 Apr 2024 13:21  --------------------------------------------------------  IN: 200 mL / OUT: 1 mL / NET: 199 mL        Physical Exam:  Appearance: No acute distress; well appearing  Eyes:  EOMI  HEENT: Normal oral mucosa  Cardiovascular: RRR, S1, S2, no murmurs, rubs, or gallops; no edema; no JVD  Respiratory: Clear to auscultation bilaterally  Gastrointestinal: soft, non-tender, non-distended  Musculoskeletal: No clubbing;  Neurologic: Non-focal  Psychiatry: AAOx3, mood & affect appropriate                          12.1   4.82  )-----------( 356      ( 03 Apr 2024 05:59 )             37.1     04-03    140  |  107  |  27<H>  ----------------------------<  102<H>  3.9   |  22  |  0.78    Ca    9.3      03 Apr 2024 05:59            cMRI  IMPRESSION:    LEFT VENTRICLE: Normal size based on LVEDVI. Borderline mild to moderate   systolic function depression, 40% EF. Regional wall motion abnormality of   the septum and inferior wall. Dyssynchrony noted between the right and   left ventricle. Correlate for arrhythmia. No myocardial hypertrophy or   clear evidence of edema. Nonischemic pattern of delayed enhancement at   the mid ventricle inferior and inferoseptal walls as described above.   Correlate for nonischemic process such as sarcoidosis or myocarditis.    RIGHT VENTRICLE: Normal size based on RVEDVI. Borderline normal systolic   function, 47% EF. Dyssynchrony noted between the right and left   ventricle. No delayed enhancement.

## 2024-04-09 NOTE — CHART NOTE - NSCHARTNOTEFT_GEN_A_CORE
Patient was outreached but did not answer. A voicemail was left for the patient to return our call.
Patient was outreached but did not answer. A voicemail was left for the patient to return our call.
s/p EGD with esophagitis (likely source of upper GI bleeding) and hiatal hernia. Hgb today is improved. No overt bleeding since admission. No further inpatient GI intervention. Okay to discharge home on PPI BID and GI f/u for outpatient colonoscopy to evaluate weight loss.     Follow up in Gastroenterology Clinic: [phone] ([facility] at [address] [place] [address]) or [phone] ([facility] at [address] [place] [address]) or [phone] ([facility] at [address] [place] [address])  or [place] Office: [phone] ([address] [place] [address]. [place], [address])    Andrés Reyes MD  Gastroenterology/Hepatology Fellow

## (undated) DEVICE — CATH IV SAFE BC 22G X 1" (BLUE)

## (undated) DEVICE — BITE BLOCK ADULT 20 X 27MM (GREEN)

## (undated) DEVICE — SUCTION YANKAUER NO CONTROL VENT

## (undated) DEVICE — FOLEY HOLDER STATLOCK 2 WAY ADULT

## (undated) DEVICE — CATH IV SAFE BC 20G X 1.16" (PINK)

## (undated) DEVICE — SOL INJ NS 0.9% 500ML 2 PORT

## (undated) DEVICE — TUBING SUCTION 20FT

## (undated) DEVICE — BALLOON US ENDO

## (undated) DEVICE — TUBING SUCTION CONN 6FT STERILE

## (undated) DEVICE — SENSOR O2 FINGER ADULT

## (undated) DEVICE — TUBING IV SET GRAVITY 3Y 100" MACRO

## (undated) DEVICE — SYR ALLIANCE II INFLATION 60ML

## (undated) DEVICE — PACK IV START WITH CHG